# Patient Record
Sex: FEMALE | Race: WHITE | NOT HISPANIC OR LATINO | Employment: OTHER | URBAN - METROPOLITAN AREA
[De-identification: names, ages, dates, MRNs, and addresses within clinical notes are randomized per-mention and may not be internally consistent; named-entity substitution may affect disease eponyms.]

---

## 2017-05-16 ENCOUNTER — TELEPHONE (OUTPATIENT)
Dept: FAMILY MEDICINE | Facility: CLINIC | Age: 42
End: 2017-05-16

## 2017-05-16 NOTE — TELEPHONE ENCOUNTER
Pt called about having Diarrhea, Nausea, dizziness and back hurts. Pt states that she is unable to get comfortable laying down.   Called Pt back and got her VM. Left a message that she could try the BRAT diet and or give it 24 hours and see what happens.   She could also come in if she felt.  I also stated that she could reach back out to me if she wanted. Gave her both the CS and the Main number for appointments.     Samreen.AUTUMN Tomlinson ([facility])

## 2017-05-26 DIAGNOSIS — F33.0 MAJOR DEPRESSIVE DISORDER, RECURRENT EPISODE, MILD (H): ICD-10-CM

## 2017-05-26 RX ORDER — VENLAFAXINE HYDROCHLORIDE 75 MG/1
CAPSULE, EXTENDED RELEASE ORAL
Qty: 90 CAPSULE | Refills: 0 | OUTPATIENT
Start: 2017-05-26

## 2017-05-26 NOTE — TELEPHONE ENCOUNTER
Refused Prescriptions:                       Disp   Refills    venlafaxine (EFFEXOR-XR) 75 MG 24 hr capsu*90 cap*0        Sig: TAKE 3 CAPSULES BY MOUTH EVERY DAY  Refused By: NIKKI QUEEN  Reason for Refusal: Refill not appropriate    Pt has not been here since 9-.  Pt has an appt on 5- with [facility]  Talked to Bernadette. Pt is going to get meds from her pharmacy to cover her up to her appt.  Lida  [phone] (home)

## 2017-05-30 ENCOUNTER — OFFICE VISIT (OUTPATIENT)
Dept: FAMILY MEDICINE | Facility: CLINIC | Age: 42
End: 2017-05-30

## 2017-05-30 VITALS
DIASTOLIC BLOOD PRESSURE: 80 MMHG | WEIGHT: 160.4 LBS | BODY MASS INDEX: 24.03 KG/M2 | HEART RATE: 89 BPM | SYSTOLIC BLOOD PRESSURE: 116 MMHG | TEMPERATURE: 98 F | OXYGEN SATURATION: 99 %

## 2017-05-30 DIAGNOSIS — G47.00 INSOMNIA, UNSPECIFIED TYPE: Primary | ICD-10-CM

## 2017-05-30 DIAGNOSIS — Z23 NEED FOR VACCINATION: ICD-10-CM

## 2017-05-30 DIAGNOSIS — F33.0 MAJOR DEPRESSIVE DISORDER, RECURRENT EPISODE, MILD (H): ICD-10-CM

## 2017-05-30 DIAGNOSIS — R63.5 WEIGHT GAIN: ICD-10-CM

## 2017-05-30 DIAGNOSIS — J45.20 MILD INTERMITTENT ASTHMA WITHOUT COMPLICATION: ICD-10-CM

## 2017-05-30 PROCEDURE — 90732 PPSV23 VACC 2 YRS+ SUBQ/IM: CPT | Performed by: PHYSICIAN ASSISTANT

## 2017-05-30 PROCEDURE — 90472 IMMUNIZATION ADMIN EACH ADD: CPT | Performed by: PHYSICIAN ASSISTANT

## 2017-05-30 PROCEDURE — 99213 OFFICE O/P EST LOW 20 MIN: CPT | Mod: 25 | Performed by: PHYSICIAN ASSISTANT

## 2017-05-30 PROCEDURE — 90471 IMMUNIZATION ADMIN: CPT | Performed by: PHYSICIAN ASSISTANT

## 2017-05-30 PROCEDURE — 90715 TDAP VACCINE 7 YRS/> IM: CPT | Performed by: PHYSICIAN ASSISTANT

## 2017-05-30 RX ORDER — TRAZODONE HYDROCHLORIDE 50 MG/1
50 TABLET, FILM COATED ORAL AT BEDTIME
Qty: 90 TABLET | Refills: 3 | Status: SHIPPED | OUTPATIENT
Start: 2017-05-30 | End: 2018-07-12

## 2017-05-30 RX ORDER — ALBUTEROL SULFATE 90 UG/1
AEROSOL, METERED RESPIRATORY (INHALATION)
Qty: 18 G | Refills: 3 | Status: SHIPPED | OUTPATIENT
Start: 2017-05-30 | End: 2018-07-11

## 2017-05-30 RX ORDER — VENLAFAXINE HYDROCHLORIDE 75 MG/1
CAPSULE, EXTENDED RELEASE ORAL
Qty: 270 CAPSULE | Refills: 3 | Status: SHIPPED | OUTPATIENT
Start: 2017-05-30 | End: 2018-06-05

## 2017-05-30 NOTE — PATIENT INSTRUCTIONS
[facility] JEFF Ceballos  [address]   [address]  [place] [address]  [phone] - appt line  [phone] - fax    Please call to schedule appointment. Please check with your insurance regarding coverage - if the recommended physician/provider above is not in your network please contact our referral specialist Kesha for further assistance   [phone]    When you see the specialist, if any testing or Xrays are   ordered, this needs to be communicated with our   clinic referral specialists before the tests are done to make   sure the tests are covered.

## 2017-05-30 NOTE — MR AVS SNAPSHOT
After Visit Summary   5/30/2017    Madeline Schafer    MRN: [id number]           Patient Information     Date Of Birth          1975        Visit Information        Provider Department      5/30/2017 2:15 PM Deanna Dumont PA [facility]        Today's Diagnoses     Insomnia, unspecified type    -  1    Major depressive disorder, recurrent episode, mild (H)        Mild intermittent asthma without complication          Care Instructions    [facility] JEFF Ceballos  [address]   [address]  [place] [address]  [phone] - appt line  [phone] - fax    Please call to schedule appointment. Please check with your insurance regarding coverage - if the recommended physician/provider above is not in your network please contact our referral specialist Kesha for further assistance   [phone]    When you see the specialist, if any testing or Xrays are   ordered, this needs to be communicated with our   clinic referral specialists before the tests are done to make   sure the tests are covered.              Follow-ups after your visit        Who to contact     If you have questions or need follow up information about today's clinic visit or your schedule please contact [facility] directly at [phone].  Normal or non-critical lab and imaging results will be communicated to you by MyChart, letter or phone within 4 business days after the clinic has received the results. If you do not hear from us within 7 days, please contact the clinic through MyChart or phone. If you have a critical or abnormal lab result, we will notify you by phone as soon as possible.  Submit refill requests through [employer]â„¢ or call your pharmacy and they will forward the refill request to us. Please allow 3 business days for your refill to be completed.          Additional Information About Your  Visit        [employer] Information     [employer] gives you secure access to your electronic health record. If you see a primary care provider, you can also send messages to your care team and make appointments. If you have questions, please call your primary care clinic.  If you do not have a primary care provider, please call [phone] and they will assist you.        Care EveryWhere ID     This is your Care EveryWhere ID. This could be used by other organizations to access your [facility] medical records  [id number]        Your Vitals Were     Pulse Temperature Pulse Oximetry Breastfeeding? BMI (Body Mass Index)       89 98  F (36.7  C) (Oral) 99% No 24.03 kg/m2        Blood Pressure from Last 3 Encounters:   05/30/17 116/80   09/22/15 102/72   03/03/15 125/85    Weight from Last 3 Encounters:   05/30/17 72.8 kg (160 lb 6.4 oz)   09/22/15 76.7 kg (169 lb)   03/03/15 76.9 kg (169 lb 9.6 oz)              We Performed the Following     Asthma Action Plan (AAP)          Today's Medication Changes          These changes are accurate as of: 5/30/17  3:08 PM.  If you have any questions, ask your nurse or doctor.               These medicines have changed or have updated prescriptions.        Dose/Directions    albuterol 108 (90 BASE) MCG/ACT Inhaler   Commonly known as:  VENTOLIN HFA   This may have changed:  See the new instructions.   Used for:  Mild intermittent asthma without complication   Changed by:  Deanna Dumont PA        INHALE 2 PUFFS INTO THE LUNGS EVERY 6 HOURS AS NEEDED FOR SHORTNESS OF BREATH/ DYSPNEA   Quantity:  18 g   Refills:  3            Where to get your medicines      These medications were sent to [facility] [address] [facility] [address] [address] [address] [address], [facility] [address]     Phone:  [phone]     albuterol 108 (90 BASE) MCG/ACT Inhaler    traZODone 50 MG tablet    venlafaxine 75 MG 24 hr capsule                Primary Care  Provider Office Phone # Fax #    JEFF Givens [phone]       [facility]  [address]  [facility] [address]        Thank you!     Thank you for choosing [facility]  for your care. Our goal is always to provide you with excellent care. Hearing back from our patients is one way we can continue to improve our services. Please take a few minutes to complete the written survey that you may receive in the mail after your visit with us. Thank you!             Your Updated Medication List - Protect others around you: Learn how to safely use, store and throw away your medicines at www.disposemymeds.org.          This list is accurate as of: 5/30/17  3:08 PM.  Always use your most recent med list.                   Brand Name Dispense Instructions for use    albuterol 108 (90 BASE) MCG/ACT Inhaler    VENTOLIN HFA    18 g    INHALE 2 PUFFS INTO THE LUNGS EVERY 6 HOURS AS NEEDED FOR SHORTNESS OF BREATH/ DYSPNEA       MIRENA (52 MG) 20 MCG/24HR IUD   Generic drug:  levonorgestrel      None Entered       traZODone 50 MG tablet    DESYREL    90 tablet    Take 1 tablet (50 mg) by mouth At Bedtime       venlafaxine 75 MG 24 hr capsule    EFFEXOR-XR    270 capsule    TAKE 3 CAPSULES BY MOUTH EVERY DAY

## 2017-05-30 NOTE — PROGRESS NOTES
SUBJECTIVE:                                                    Madeline Schafer is a 41 year old female who presents to clinic today for the following health issues:      Depression Followup    Status since last visit: Stable     See PHQ-9 for current symptoms.  Other associated symptoms: None    Complicating factors:   Significant life event:  No   Current substance abuse:  None  Anxiety or Panic symptoms:  No    PHQ-9  English PHQ-9   Any Language          Asthma Follow-Up    Was ACT completed today?    Yes    ACT Total Scores 9/22/2015   ACT TOTAL SCORE -   ASTHMA ER VISITS -   ASTHMA HOSPITALIZATIONS -   ACT TOTAL SCORE (Goal Greater than or Equal to 20) 21   In the past 12 months, how many times did you visit the emergency room for your asthma without being admitted to the hospital? 0   In the past 12 months, how many times were you hospitalized overnight because of your asthma? 0       Recent asthma triggers that patient is dealing with: None      Pt interested on going back on Phentermine. Was on this for weight loss.  Lost 20 lbs.           ROS:   C: NEGATIVE for fever, chills, change in weight  E: NEGATIVE for vision changes or irritation  E/M: NEGATIVE for ear, mouth and throat problems  R: NEGATIVE for significant cough or SOB  CV: NEGATIVE for chest pain, palpitations or peripheral edema  GI: NEGATIVE for nausea, abdominal pain, heartburn, or change in bowel habits  : NEGATIVE for frequency, dysuria, or hematuria        Labs reviewed in EPIC  BP Readings from Last 3 Encounters:   05/30/17 116/80   09/22/15 102/72   03/03/15 125/85    Wt Readings from Last 3 Encounters:   05/30/17 72.8 kg (160 lb 6.4 oz)   09/22/15 76.7 kg (169 lb)   03/03/15 76.9 kg (169 lb 9.6 oz)                  Patient Active Problem List   Diagnosis     FAMILY HX GI MALIGNANCY     Allergic rhinitis     Health Care Home     Advance care planning     Major depressive disorder, recurrent episode, mild (H)     Mild intermittent  asthma without complication     Past Surgical History:   Procedure Laterality Date     HC EXCISION LESION/TENDON-SHEATH/CAPSULE, FOOT  age 11     HCL PAP THIN LAYER SCREEN  2007    Dr Acevedo       Social History   Substance Use Topics     Smoking status: Never Smoker     Smokeless tobacco: Not on file     Alcohol use 2.5 oz/week     5 drink(s) per week     Family History   Problem Relation Age of Onset     Lipids Mother      Lipids Father      Cancer - colorectal Maternal Grandfather       age 70 colon cancer     Prostate Cancer Father      diagnosed age 62         Current Outpatient Prescriptions   Medication Sig Dispense Refill     VENTOLIN  (90 BASE) MCG/ACT inhaler INHALE 2 PUFFS INTO THE LUNGS EVERY 6 HOURS AS NEEDED FOR SHORTNESS OF BREATH/ DYSPNEA 18 g 0     venlafaxine (EFFEXOR-XR) 75 MG 24 hr capsule TAKE 3 CAPSULES BY MOUTH EVERY  capsule 3     traZODone (DESYREL) 50 MG tablet Take 1 tablet by mouth At Bedtime. 90 tablet 0     MIRENA 20 MCG/24HR IU IUD None Entered       Allergies   Allergen Reactions     No Known Allergies      Seasonal Allergies      No lab results found.           OBJECTIVE:   /80 (BP Location: Left arm, Patient Position: Chair, Cuff Size: Adult Regular)  Pulse 89  Temp 98  F (36.7  C) (Oral)  Wt 72.8 kg (160 lb 6.4 oz)  SpO2 99%  Breastfeeding? No  BMI 24.03 kg/m2   Body mass index is 24.03 kg/(m^2).       GENERAL: healthy, alert and no distress  HEAD: Normocephalic, atraumatic  EYES: Eyes grossly normal to inspection, extraocular movements - intact in all directions. No discharge  EARS: canals- normal; TMs- normal  NOSE:  Nasal mucosa pink and moist. No abnormal discharge.  MOUTH:   Mucous membranes moist.  Pharynx non-erythematous, no exudates. No ulcers, no lesions  NECK: no tenderness, no adenopathy,  no masses, no stiffness; thyroid- normal to palpation  RESP: lungs clear to auscultation - no rales, no rhonchi, no wheezes  CV: regular rates and  rhythm, normal S1 S2, no S3 or S4 and no murmur, no click or rub     MS: extremities- no gross deformities noted  NEURO: strength and tone- normal, sensory exam- grossly normal, mentation- intact, speech- normal          ASSESSMENT/PLAN:                                                    1. Major depressive disorder, recurrent episode, mild (H)  - venlafaxine (EFFEXOR-XR) 75 MG 24 hr capsule; TAKE 3 CAPSULES BY MOUTH EVERY DAY  Dispense: 270 capsule; Refill: 3    2. Mild intermittent asthma without complication  - albuterol (VENTOLIN HFA) 108 (90 BASE) MCG/ACT Inhaler; INHALE 2 PUFFS INTO THE LUNGS EVERY 6 HOURS AS NEEDED FOR SHORTNESS OF BREATH/ DYSPNEA  Dispense: 18 g; Refill: 3    3. Insomnia, unspecified type  - traZODone (DESYREL) 50 MG tablet; Take 1 tablet (50 mg) by mouth At Bedtime  Dispense: 90 tablet; Refill: 3    4. Weight gain  Advised Endo/obesity consult    Follow Up: 1 year      [facility]

## 2017-05-30 NOTE — LETTER
My Asthma Action Plan  Name: Madeline Schafer   YOB: 1975  Date: 5/30/2017   My doctor: JEFF Givens   My clinic: [facility]        My Control Medicine: not needed  My Rescue Medicine: Albuterol (Proair/Ventolin/Proventil) inhaler 2 puffs every 4-6 hours   My Asthma Severity: intermittent  Avoid your asthma triggers: bulldogs               GREEN ZONE     Good Control    I feel good    No cough or wheeze    Can work, sleep and play without asthma symptoms       Take your asthma control medicine every day.     1. If exercise triggers your asthma, take your rescue medication    15 minutes before exercise or sports, and    During exercise if you have asthma symptoms  2. Spacer to use with inhaler: If you have a spacer, make sure to use it with your inhaler             YELLOW ZONE     Getting Worse  I have ANY of these:    I do not feel good    Cough or wheeze    Chest feels tight    Wake up at night   1. Keep taking your Green Zone medications  2. Start taking your rescue medicine:    every 20 minutes for up to 1 hour. Then every 4 hours for 24-48 hours.  3. If you stay in the Yellow Zone for more than 12-24 hours, contact your doctor.  4. If you do not return to the Green Zone in 12-24 hours or you get worse, start taking your oral steroid medicine if prescribed by your provider.           RED ZONE     Medical Alert - Get Help  I have ANY of these:    I feel awful    Medicine is not helping    Breathing getting harder    Trouble walking or talking    Nose opens wide to breathe       1. Take your rescue medicine NOW  2. If your provider has prescribed an oral steroid medicine, start taking it NOW  3. Call your doctor NOW  4. If you are still in the Red Zone after 20 minutes and you have not reached your doctor:    Take your rescue medicine again and    Call 911 or go to the emergency room right away    See your regular doctor within 2 weeks of an Emergency Room or  Urgent Care visit for follow-up treatment.        Electronically signed by: Deanna Dumont, May 30, 2017    Annual Reminders:  Meet with Asthma Educator,  Flu Shot in the Fall, consider Pneumonia Vaccination for patients with asthma (aged 19 and older).    [facility] [address] [place] [address] [address]                    Asthma Triggers  How To Control Things That Make Your Asthma Worse    Triggers are things that make your asthma worse.  Look at the list below to help you find your triggers and what you can do about them.  You can help prevent asthma flare-ups by staying away from your triggers.      Trigger                                                          What you can do   Cigarette Smoke  Tobacco smoke can make asthma worse. Do not allow smoking in your home, car or around you.  Be sure no one smokes at a child s day care or school.  If you smoke, ask your health care provider for ways to help you quit.  Ask family members to quit too.  Ask your health care provider for a referral to Quit Plan to help you quit smoking, or call 2-324-406-PLAN.     Colds, Flu, Bronchitis  These are common triggers of asthma. Wash your hands often.  Don t touch your eyes, nose or mouth.  Get a flu shot every year.     Dust Mites  These are tiny bugs that live in cloth or carpet. They are too small to see. Wash sheets and blankets in hot water every week.   Encase pillows and mattress in dust mite proof covers.  Avoid having carpet if you can. If you have carpet, vacuum weekly.   Use a dust mask and HEPA vacuum.   Pollen and Outdoor Mold  Some people are allergic to trees, grass, or weed pollen, or molds. Try to keep your windows closed.  Limit time out doors when pollen count is high.   Ask you health care provider about taking medicine during allergy season.     Animal Dander  Some people are allergic to skin flakes, urine or saliva from pets with fur or  feathers. Keep pets with fur or feathers out of your home.    If you can t keep the pet outdoors, then keep the pet out of your bedroom.  Keep the bedroom door closed.  Keep pets off cloth furniture and away from stuffed toys.     Mice, Rats, and Cockroaches  Some people are allergic to the waste from these pests.   Cover food and garbage.  Clean up spills and food crumbs.  Store grease in the refrigerator.   Keep food out of the bedroom.   Indoor Mold  This can be a trigger if your home has high moisture. Fix leaking faucets, pipes, or other sources of water.   Clean moldy surfaces.  Dehumidify basement if it is damp and smelly.   Smoke, Strong Odors, and Sprays  These can reduce air quality. Stay away from strong odors and sprays, such as perfume, powder, hair spray, paints, smoke incense, paint, cleaning products, candles and new carpet.   Exercise or Sports  Some people with asthma have this trigger. Be active!  Ask your doctor about taking medicine before sports or exercise to prevent symptoms.    Warm up for 5-10 minutes before and after sports or exercise.     Other Triggers of Asthma  Cold air:  Cover your nose and mouth with a scarf.  Sometimes laughing or crying can be a trigger.  Some medicines and food can trigger asthma.

## 2017-05-30 NOTE — NURSING NOTE
Madeline is here for a non-fasting medication recheck.     Pre-Visit Screening :  Immunizations : up to date    Colonoscopy : NA  Mammogram : Up to date  Asthma Action Test/Plan : done today  PHQ9/GAD7 :  Done today    Pulse - regular  My Chart - accepts    CLASSIFICATION OF OVERWEIGHT AND OBESITY BY BMI                         Obesity Class           BMI(kg/m2)  Underweight                                    < 18.5  Normal                                         18.5-24.9  Overweight                                     25.0-29.9  OBESITY                     I                  30.0-34.9                              II                 35.0-39.9  EXTREME OBESITY             III                >40                             Patient's  BMI Body mass index is 24.03 kg/(m^2).  http://hin.nhlbi.nih.gov/menuplanner/menu.cgi  Questioned patient about current smoking habits.  Pt. has never smoked.    AUTUMN Reyes ([facility])

## 2017-05-31 ASSESSMENT — PATIENT HEALTH QUESTIONNAIRE - PHQ9: SUM OF ALL RESPONSES TO PHQ QUESTIONS 1-9: 1

## 2017-08-12 ENCOUNTER — HEALTH MAINTENANCE LETTER (OUTPATIENT)
Age: 42
End: 2017-08-12

## 2017-09-11 ENCOUNTER — HOSPITAL ENCOUNTER (OUTPATIENT)
Dept: MAMMOGRAPHY | Facility: CLINIC | Age: 42
Discharge: HOME | End: 2017-09-11
Attending: OBSTETRICS & GYNECOLOGY | Admitting: OBSTETRICS & GYNECOLOGY
Payer: COMMERCIAL

## 2017-09-11 DIAGNOSIS — Z12.31 VISIT FOR SCREENING MAMMOGRAM: ICD-10-CM

## 2017-09-11 PROCEDURE — 77063 BREAST TOMOSYNTHESIS BI: CPT

## 2017-09-11 PROCEDURE — G0202 SCR MAMMO BI INCL CAD: HCPCS

## 2018-02-12 ENCOUNTER — OFFICE VISIT (OUTPATIENT)
Dept: FAMILY MEDICINE | Facility: CLINIC | Age: 43
End: 2018-02-12

## 2018-02-12 VITALS
OXYGEN SATURATION: 98 % | WEIGHT: 169.6 LBS | HEART RATE: 92 BPM | SYSTOLIC BLOOD PRESSURE: 124 MMHG | BODY MASS INDEX: 25.41 KG/M2 | DIASTOLIC BLOOD PRESSURE: 90 MMHG | TEMPERATURE: 98 F

## 2018-02-12 DIAGNOSIS — J01.90 ACUTE SINUSITIS WITH SYMPTOMS > 10 DAYS: Primary | ICD-10-CM

## 2018-02-12 PROCEDURE — 99213 OFFICE O/P EST LOW 20 MIN: CPT | Performed by: FAMILY MEDICINE

## 2018-02-12 NOTE — PROGRESS NOTES
SUBJECTIVE:   Madeline Schafer is a 42 year old female who presents to clinic today for the following health issues:    Two weeks ago, the patient developed an upper respiratory cold after her son brought home the illness from school. Four days ago, her symptoms worsened and now she is concerned she may have a sinus infection. The patient currently complains of sinus congestion, bilateral ear pain, headache, and dental pain. She also has low back pain, but attributes this to the cough she had before her sinus symptoms developed. She has no urinary symptoms. She has a history of sinus infections, but has not been treated for one for a few years. No previous sinus surgery or recent air travel. She has been treating with over the counter decongestants.     Problem list and histories reviewed & adjusted, as indicated.  Additional history: as documented    Patient Active Problem List   Diagnosis     FAMILY HX GI MALIGNANCY     Allergic rhinitis     Health Care Home     Advance care planning     Major depressive disorder, recurrent episode, mild (H)     Mild intermittent asthma without complication     Past Surgical History:   Procedure Laterality Date     HC EXCISION LESION/TENDON-SHEATH/CAPSULE, FOOT  age 11     HCL PAP THIN LAYER SCREEN  2007    Dr Acevedo       Social History   Substance Use Topics     Smoking status: Never Smoker     Smokeless tobacco: Never Used     Alcohol use 2.5 oz/week     5 Standard drinks or equivalent per week     Family History   Problem Relation Age of Onset     Lipids Mother      Lipids Father      Cancer - colorectal Maternal Grandfather       age 70 colon cancer     Prostate Cancer Father      diagnosed age 62         Current Outpatient Prescriptions   Medication Sig Dispense Refill     venlafaxine (EFFEXOR-XR) 75 MG 24 hr capsule TAKE 3 CAPSULES BY MOUTH EVERY  capsule 3     traZODone (DESYREL) 50 MG tablet Take 1 tablet (50 mg) by mouth At Bedtime 90 tablet 3      albuterol (VENTOLIN HFA) 108 (90 BASE) MCG/ACT Inhaler INHALE 2 PUFFS INTO THE LUNGS EVERY 6 HOURS AS NEEDED FOR SHORTNESS OF BREATH/ DYSPNEA 18 g 3     MIRENA 20 MCG/24HR IU IUD None Entered         Reviewed and updated as needed this visit by clinical staff  Tobacco  Allergies  Meds  Problems  Soc Hx      Reviewed and updated as needed this visit by Provider         ROS:   ROS: 7 point ROS neg other than the symptoms noted above in the HPI.      OBJECTIVE:     /90 (BP Location: Left arm, Patient Position: Chair, Cuff Size: Adult Regular)  Pulse 92  Temp 98  F (36.7  C) (Oral)  Wt 76.9 kg (169 lb 9.6 oz)  SpO2 98%  Breastfeeding? No  BMI 25.41 kg/m2  Body mass index is 25.41 kg/(m^2).   Constitutional: Alert, oriented, mildly Ill appearing  HEENT: External ears  and canals clear bilaterally. TM's normal bilaterally. Nose normal without lesions or discharge. Oropharynx normal. Neck supple without palpable adenopathy.  Cardiovascular: Regular rate and  rhythm. S1 and S2 normal, no murmurs, clicks, gallops or rubs. No edema or JVD.  Pulmonary: Chest is clear; no wheezes or rales.     Diagnostic Test Results:  none     ASSESSMENT/PLAN:     Problem List Items Addressed This Visit     None           (J01.90) Acute sinusitis with symptoms > 10 days  (primary encounter diagnosis)  Comment: Trial of medication. Call or return to clinic prn if these symtoms worsen, fail to improve as anticipated, or if new symptoms develop.  Plan: amoxicillin-clavulanate (AUGMENTIN) 875-125 MG         per tablet            Scribe Statement: IJavan, PSS, am scribing for and in the presence of Malena Conde MD. 2/12/2018 1:51 PM    Provider Statement: I personally performed this service and the scribe documentation above accurately reflects this service. Malena Conde MD 2/12/2018 1:51 PM    Malena Conde MD  [facility]

## 2018-02-12 NOTE — MR AVS SNAPSHOT
After Visit Summary   2/12/2018    Madeline Schafer    MRN: [id number]           Patient Information     Date Of Birth          1975        Visit Information        Provider Department      2/12/2018 6:45 PM Malena Conde MD [facility]        Today's Diagnoses     Acute sinusitis with symptoms > 10 days    -  1       Follow-ups after your visit        Who to contact     If you have questions or need follow up information about today's clinic visit or your schedule please contact [facility] directly at [phone].  Normal or non-critical lab and imaging results will be communicated to you by [employer], letter or phone within 4 business days after the clinic has received the results. If you do not hear from us within 7 days, please contact the clinic through [employer] or phone. If you have a critical or abnormal lab result, we will notify you by phone as soon as possible.  Submit refill requests through [employer] or call your pharmacy and they will forward the refill request to us. Please allow 3 business days for your refill to be completed.          Additional Information About Your Visit        MyChart Information     [employer] gives you secure access to your electronic health record. If you see a primary care provider, you can also send messages to your care team and make appointments. If you have questions, please call your primary care clinic.  If you do not have a primary care provider, please call [phone] and they will assist you.        Care EveryWhere ID     This is your Care EveryWhere ID. This could be used by other organizations to access your [place] medical records  [id number]        Your Vitals Were     Pulse Temperature Pulse Oximetry Breastfeeding? BMI (Body Mass Index)       92 98  F (36.7  C) (Oral) 98% No 25.41 kg/m2        Blood Pressure from Last 3 Encounters:   02/12/18 124/90   05/30/17 116/80   09/22/15 102/72     Weight from Last 3 Encounters:   02/12/18 76.9 kg (169 lb 9.6 oz)   05/30/17 72.8 kg (160 lb 6.4 oz)   09/22/15 76.7 kg (169 lb)              Today, you had the following     No orders found for display         Today's Medication Changes          These changes are accurate as of 2/12/18 11:59 PM.  If you have any questions, ask your nurse or doctor.               Start taking these medicines.        Dose/Directions    amoxicillin-clavulanate 875-125 MG per tablet   Commonly known as:  AUGMENTIN   Used for:  Acute sinusitis with symptoms > 10 days   Started by:  Malena Conde MD        Dose:  1 tablet   Take 1 tablet by mouth 2 times daily   Quantity:  20 tablet   Refills:  0            Where to get your medicines      These medications were sent to [facility] [id number] [place] [address] [address] [facility] AT [address]  [address] [facility] [address]     Phone:  [phone]     amoxicillin-clavulanate 875-125 MG per tablet                Primary Care Provider Office Phone # Fax #    JEFF Givens [phone] [address]  [facility] [address]        Equal Access to Services     JACOBO WRIGHT AH: Hadii aad ku hadasho Soomaali, waaxda luqadaha, qaybta kaalmada adeegyada, waxay idiin hayaristeon cayla khglenna labarbara brown. So [facility] [phone].    ATENCIÓN: Si habla español, tiene a xie disposición servicios gratuitos de asistencia lingüística. ame al [phone].    We comply with applicable federal civil rights laws and [place] laws. We do not discriminate on the basis of race, color, national origin, age, disability, sex, sexual orientation, or gender identity.            Thank you!     Thank you for choosing [facility]  for your care. Our goal is always to provide you with excellent care. Hearing back from our patients is one way we can continue to improve our services. Please take a few minutes to complete the written  survey that you may receive in the mail after your visit with us. Thank you!             Your Updated Medication List - Protect others around you: Learn how to safely use, store and throw away your medicines at www.disposemymeds.org.          This list is accurate as of 2/12/18 11:59 PM.  Always use your most recent med list.                   Brand Name Dispense Instructions for use Diagnosis    albuterol 108 (90 BASE) MCG/ACT Inhaler    VENTOLIN HFA    18 g    INHALE 2 PUFFS INTO THE LUNGS EVERY 6 HOURS AS NEEDED FOR SHORTNESS OF BREATH/ DYSPNEA    Mild intermittent asthma without complication       amoxicillin-clavulanate 875-125 MG per tablet    AUGMENTIN    20 tablet    Take 1 tablet by mouth 2 times daily    Acute sinusitis with symptoms > 10 days       MIRENA (52 MG) 20 MCG/24HR IUD   Generic drug:  levonorgestrel      None Entered        traZODone 50 MG tablet    DESYREL    90 tablet    Take 1 tablet (50 mg) by mouth At Bedtime    Insomnia, unspecified type       venlafaxine 75 MG 24 hr capsule    EFFEXOR-XR    270 capsule    TAKE 3 CAPSULES BY MOUTH EVERY DAY    Major depressive disorder, recurrent episode, mild (H)

## 2018-02-13 NOTE — NURSING NOTE
Madeline is here for sinus issues X 2 weeks, pressure in sinus area, headaches, pt states over the weekend it got worse, ear pain in both ears    Pre-Visit Screening :  Immunizations : up to date    Colonoscopy : na  Mammogram : is up to date  Asthma Action Test/Plan : na  PHQ9/GAD7 :  Na    Pulse - regular  My Chart - accepts    CLASSIFICATION OF OVERWEIGHT AND OBESITY BY BMI                         Obesity Class           BMI(kg/m2)  Underweight                                    < 18.5  Normal                                         18.5-24.9  Overweight                                     25.0-29.9  OBESITY                     I                  30.0-34.9                              II                 35.0-39.9  EXTREME OBESITY             III                >40                             Patient's  BMI Body mass index is 22.15 kg/(m^2).  http://hin.nhlbi.nih.gov/menuplanner/menu.cgi  Questioned patient about current smoking habits.  Pt. has never smoked.  The patient has verbalized that it is ok to leave a detailed voice message on the patient's cell phone with results/recommendations from this visit.       Verified [phone]  phone number:

## 2018-02-15 ENCOUNTER — TELEPHONE (OUTPATIENT)
Dept: FAMILY MEDICINE | Facility: CLINIC | Age: 43
End: 2018-02-15

## 2018-02-15 NOTE — TELEPHONE ENCOUNTER
Madeline saw [facility] on Monday evening for sinus inf.  She is now experiencing a lot of chest discomfort and wondering if the med will cover this and is it normal for it to move to her chest.  Also she believes she has a yeast inf from the medication and would like something to help relieve it.    Please call pt at [phone]

## 2018-06-05 DIAGNOSIS — F33.0 MAJOR DEPRESSIVE DISORDER, RECURRENT EPISODE, MILD (H): ICD-10-CM

## 2018-06-05 RX ORDER — VENLAFAXINE HYDROCHLORIDE 75 MG/1
CAPSULE, EXTENDED RELEASE ORAL
Qty: 90 CAPSULE | Refills: 0 | COMMUNITY
Start: 2018-06-05 | End: 2018-07-11

## 2018-06-05 RX ORDER — VENLAFAXINE HYDROCHLORIDE 75 MG/1
CAPSULE, EXTENDED RELEASE ORAL
Qty: 270 CAPSULE | Refills: 0 | OUTPATIENT
Start: 2018-06-05

## 2018-06-05 NOTE — TELEPHONE ENCOUNTER
[facility]'s  venlafaxine (EFFEXOR-XR) 75 MG 24 hr capsule  Pt due for a non fasting ov  [place]  [phone] ([place])

## 2018-07-11 ENCOUNTER — OFFICE VISIT (OUTPATIENT)
Dept: FAMILY MEDICINE | Facility: CLINIC | Age: 43
End: 2018-07-11

## 2018-07-11 VITALS
HEART RATE: 79 BPM | BODY MASS INDEX: 26.13 KG/M2 | OXYGEN SATURATION: 98 % | DIASTOLIC BLOOD PRESSURE: 76 MMHG | WEIGHT: 174.4 LBS | TEMPERATURE: 98.2 F | SYSTOLIC BLOOD PRESSURE: 118 MMHG

## 2018-07-11 DIAGNOSIS — F33.0 MAJOR DEPRESSIVE DISORDER, RECURRENT EPISODE, MILD (H): ICD-10-CM

## 2018-07-11 DIAGNOSIS — J45.20 MILD INTERMITTENT ASTHMA WITHOUT COMPLICATION: ICD-10-CM

## 2018-07-11 DIAGNOSIS — Z12.11 SPECIAL SCREENING FOR MALIGNANT NEOPLASMS, COLON: Primary | ICD-10-CM

## 2018-07-11 DIAGNOSIS — E66.3 OVERWEIGHT: ICD-10-CM

## 2018-07-11 PROCEDURE — 99213 OFFICE O/P EST LOW 20 MIN: CPT | Performed by: PHYSICIAN ASSISTANT

## 2018-07-11 RX ORDER — VENLAFAXINE HYDROCHLORIDE 75 MG/1
CAPSULE, EXTENDED RELEASE ORAL
Qty: 90 CAPSULE | Refills: 3 | Status: SHIPPED | OUTPATIENT
Start: 2018-07-11 | End: 2018-07-16

## 2018-07-11 RX ORDER — ALBUTEROL SULFATE 90 UG/1
AEROSOL, METERED RESPIRATORY (INHALATION)
Qty: 18 G | Refills: 3 | Status: SHIPPED | OUTPATIENT
Start: 2018-07-11 | End: 2019-07-22

## 2018-07-11 NOTE — LETTER
My Asthma Action Plan  Name: Madeline Schafer   YOB: 1975  Date: 7/11/2018   My doctor: JEFF Givens   My clinic: [facility]        My Control Medicine: None  My Rescue Medicine: Albuterol (Proair/Ventolin/Proventil) inhaler as needed   My Asthma Severity: intermittent  Avoid your asthma triggers: Patient is unaware of triggers  None            GREEN ZONE   Good Control    I feel good    No cough or wheeze    Can work, sleep and play without asthma symptoms       Take your asthma control medicine every day.     1. If exercise triggers your asthma, take your rescue medication    15 minutes before exercise or sports, and    During exercise if you have asthma symptoms  2. Spacer to use with inhaler: If you have a spacer, make sure to use it with your inhaler             YELLOW ZONE Getting Worse  I have ANY of these:    I do not feel good    Cough or wheeze    Chest feels tight    Wake up at night   1. Keep taking your Green Zone medications  2. Start taking your rescue medicine:    every 20 minutes for up to 1 hour. Then every 4 hours for 24-48 hours.  3. If you stay in the Yellow Zone for more than 12-24 hours, contact your doctor.  4. If you do not return to the Green Zone in 12-24 hours or you get worse, start taking your oral steroid medicine if prescribed by your provider.           RED ZONE Medical Alert - Get Help  I have ANY of these:    I feel awful    Medicine is not helping    Breathing getting harder    Trouble walking or talking    Nose opens wide to breathe       1. Take your rescue medicine NOW  2. If your provider has prescribed an oral steroid medicine, start taking it NOW  3. Call your doctor NOW  4. If you are still in the Red Zone after 20 minutes and you have not reached your doctor:    Take your rescue medicine again and    Call 911 or go to the emergency room right away    See your regular doctor within 2 weeks of an Emergency Room or  Urgent Care visit for follow-up treatment.          Annual Reminders:  Meet with Asthma Educator,  Flu Shot in the Fall, consider Pneumonia Vaccination for patients with asthma (aged 19 and older).    [facility] [address] [place] [address] [address] AT [address]                      Asthma Triggers  How To Control Things That Make Your Asthma Worse    Triggers are things that make your asthma worse.  Look at the list below to help you find your triggers and what you can do about them.  You can help prevent asthma flare-ups by staying away from your triggers.      Trigger                                                          What you can do   Cigarette Smoke  Tobacco smoke can make asthma worse. Do not allow smoking in your home, car or around you.  Be sure no one smokes at a child s day care or school.  If you smoke, ask your health care provider for ways to help you quit.  Ask family members to quit too.  Ask your health care provider for a referral to Quit Plan to help you quit smoking, or call 8-106-216-PLAN.     Colds, Flu, Bronchitis  These are common triggers of asthma. Wash your hands often.  Don t touch your eyes, nose or mouth.  Get a flu shot every year.     Dust Mites  These are tiny bugs that live in cloth or carpet. They are too small to see. Wash sheets and blankets in hot water every week.   Encase pillows and mattress in dust mite proof covers.  Avoid having carpet if you can. If you have carpet, vacuum weekly.   Use a dust mask and HEPA vacuum.   Pollen and Outdoor Mold  Some people are allergic to trees, grass, or weed pollen, or molds. Try to keep your windows closed.  Limit time out doors when pollen count is high.   Ask you health care provider about taking medicine during allergy season.     Animal Dander  Some people are allergic to skin flakes, urine or saliva from pets with fur or feathers. Keep pets with fur or feathers out of your home.    If you  can t keep the pet outdoors, then keep the pet out of your bedroom.  Keep the bedroom door closed.  Keep pets off cloth furniture and away from stuffed toys.     Mice, Rats, and Cockroaches  Some people are allergic to the waste from these pests.   Cover food and garbage.  Clean up spills and food crumbs.  Store grease in the refrigerator.   Keep food out of the bedroom.   Indoor Mold  This can be a trigger if your home has high moisture. Fix leaking faucets, pipes, or other sources of water.   Clean moldy surfaces.  Dehumidify basement if it is damp and smelly.   Smoke, Strong Odors, and Sprays  These can reduce air quality. Stay away from strong odors and sprays, such as perfume, powder, hair spray, paints, smoke incense, paint, cleaning products, candles and new carpet.   Exercise or Sports  Some people with asthma have this trigger. Be active!  Ask your doctor about taking medicine before sports or exercise to prevent symptoms.    Warm up for 5-10 minutes before and after sports or exercise.     Other Triggers of Asthma  Cold air:  Cover your nose and mouth with a scarf.  Sometimes laughing or crying can be a trigger.  Some medicines and food can trigger asthma.

## 2018-07-11 NOTE — NURSING NOTE
Madeline is here for a med check    Pre-Visit Screening :  Immunizations : up to date    Colonoscopy : na  Mammogram : is up to date  Asthma Action Test/Plan : na  PHQ9/GAD7 :  Given    Pulse - regular  My Chart - accepts    CLASSIFICATION OF OVERWEIGHT AND OBESITY BY BMI                         Obesity Class           BMI(kg/m2)  Underweight                                    < 18.5  Normal                                         18.5-24.9  Overweight                                     25.0-29.9  OBESITY                     I                  30.0-34.9                              II                 35.0-39.9  EXTREME OBESITY             III                >40                             Patient's  BMI Body mass index is 22.15 kg/(m^2).  http://hin.nhlbi.nih.gov/menuplanner/menu.cgi  Questioned patient about current smoking habits.  Pt. has never smoked.  The patient has verbalized that it is ok to leave a detailed voice message on the patient's cell phone with results/recommendations from this visit.       Verified [phone] phone number:

## 2018-07-11 NOTE — PROGRESS NOTES
SUBJECTIVE:   Madeline Schafer is a 43 year old female who presents to clinic today for the following health issues:      Depression Followup    Status since last visit: Improved     See PHQ-9 for current symptoms.  Other associated symptoms: None    Complicating factors:   Significant life event:  Yes-  Death of mother anniversary is today - doing well   Current substance abuse:  None  Anxiety or Panic symptoms:  No      Trazodone - rare - every 3-4 months for a couple weeks.     PHQ-9 9/22/2015 5/30/2017   Total Score 2 1   Q9: Suicide Ideation Not at all Not at all     In the past two weeks have you had thoughts of suicide or self-harm?  No.    Do you have concerns about your personal safety or the safety of others?   No  PHQ-9  English  PHQ-9   Any Language  Suicide Assessment Five-step Evaluation and Treatment (SAFE-T)    Asthma Follow-Up    Was ACT completed today?    Yes    ACT Total Scores 7/11/2018   ACT TOTAL SCORE -   ASTHMA ER VISITS -   ASTHMA HOSPITALIZATIONS -   ACT TOTAL SCORE (Goal Greater than or Equal to 20) 25   In the past 12 months, how many times did you visit the emergency room for your asthma without being admitted to the hospital? 0   In the past 12 months, how many times were you hospitalized overnight because of your asthma? 0       Recent asthma triggers that patient is dealing with: None        Amount of exercise or physical activity: 2-3 days/week for an average of 30-45 minutes    Problems taking medications regularly: No    Medication side effects: none    Diet: regular (no restrictions)        Sees OBGYN  Due for colonoscopy        Problem list and histories reviewed & adjusted, as indicated.  Additional history: as documented    Patient Active Problem List   Diagnosis     FAMILY HX GI MALIGNANCY     Allergic rhinitis     Health Care Home     Advance care planning     Major depressive disorder, recurrent episode, mild (H)     Mild intermittent asthma without complication     Past  Surgical History:   Procedure Laterality Date     HC EXCISION LESION/TENDON-SHEATH/CAPSULE, FOOT  age 11     HCL PAP THIN LAYER SCREEN  2007    Dr Acevedo       Social History   Substance Use Topics     Smoking status: Never Smoker     Smokeless tobacco: Never Used     Alcohol use 2.5 oz/week     5 Standard drinks or equivalent per week     Family History   Problem Relation Age of Onset     Lipids Mother      Lipids Father      Cancer - colorectal Maternal Grandfather       age 70 colon cancer     Prostate Cancer Father      diagnosed age 62         Current Outpatient Prescriptions   Medication Sig Dispense Refill     albuterol (VENTOLIN HFA) 108 (90 BASE) MCG/ACT Inhaler INHALE 2 PUFFS INTO THE LUNGS EVERY 6 HOURS AS NEEDED FOR SHORTNESS OF BREATH/ DYSPNEA 18 g 3     MIRENA 20 MCG/24HR IU IUD None Entered       traZODone (DESYREL) 50 MG tablet Take 1 tablet (50 mg) by mouth At Bedtime 90 tablet 3     venlafaxine (EFFEXOR-XR) 75 MG 24 hr capsule TAKE 3 CAPSULES BY MOUTH EVERY DAY 90 capsule 0     Allergies   Allergen Reactions     No Known Allergies      Seasonal Allergies      No lab results found.       BP Readings from Last 3 Encounters:   18 118/76   18 124/90   17 116/80    Wt Readings from Last 3 Encounters:   18 79.1 kg (174 lb 6.4 oz)   18 76.9 kg (169 lb 9.6 oz)   17 72.8 kg (160 lb 6.4 oz)                  Labs reviewed in EPIC    Reviewed and updated as needed this visit by clinical staff  Tobacco  Allergies  Meds  Problems       Reviewed and updated as needed this visit by Provider         ROS:  EYES: NEGATIVE for vision changes or irritation  ENT/MOUTH: NEGATIVE for ear, mouth and throat problems  RESP: NEGATIVE for significant cough or SOB  CV: NEGATIVE for chest pain, palpitations or peripheral edema  GI: NEGATIVE for nausea, abdominal pain, heartburn, or change in bowel habits  : NEGATIVE for frequency, dysuria, or hematuria    OBJECTIVE:     /76  "(BP Location: Right arm, Patient Position: Chair, Cuff Size: Adult Large)  Pulse 79  Temp 98.2  F (36.8  C) (Oral)  Wt 79.1 kg (174 lb 6.4 oz)  SpO2 98%  Breastfeeding? No  BMI 26.13 kg/m2  Body mass index is 26.13 kg/(m^2).  GENERAL: healthy, alert and no distress  EYES: Eyes grossly normal to inspection,  conjunctivae and sclerae normal  EARS: ear canals and TM's normal  NOSE: no discharge noted  MOUTH: mouth without ulcers or lesions, mucous membranes moist  NECK: no adenopathy, no asymmetry, masses, or scars and thyroid normal to palpation  CV: regular rate and rhythm, normal S1 S2, no S3 or S4, no murmur, click or rub  RESP: lungs clear to auscultation bilaterally - no rales, rhonchi or wheezes      Mental Status Exam:   Appearance: calm  Grooming: adequately groomed  Demeanor: engaged, cooperative  Affect: normal  Speech: Normal.  Gait:Normal.  Movements: Normal  Form of thought: Logical, Linear and Goal directed  Thought content:  Normal  Insight:Good   Judgment: Good   Cognition: Good       ASSESSMENT/PLAN:     1. Major depressive disorder, recurrent episode, mild (H)  Controlled  OK for Trazodone refills for 1 year  - venlafaxine (EFFEXOR-XR) 75 MG 24 hr capsule; TAKE 3 CAPSULES BY MOUTH EVERY DAY  Dispense: 90 capsule; Refill: 3    2. Mild intermittent asthma without complication    - albuterol (VENTOLIN HFA) 108 (90 Base) MCG/ACT Inhaler; INHALE 2 PUFFS INTO THE LUNGS EVERY 6 HOURS AS NEEDED FOR SHORTNESS OF BREATH/ DYSPNEA  Dispense: 18 g; Refill: 3    3. Special screening for malignant neoplasms, colon    - GASTROENTEROLOGY ADULT REF PROCEDURE ONLY Other; [facility] [phone]    4. Overweight  Pt starting to work with     BMI:   Estimated body mass index is 26.13 kg/(m^2) as calculated from the following:    Height as of 9/22/15: 1.74 m (5' 8.5\").    Weight as of this encounter: 79.1 kg (174 lb 6.4 oz).   Weight management plan: Discussed healthy diet and exercise guidelines and " patient will follow up in 12 months in clinic to re-evaluate.      FUTURE APPOINTMENTS:       - Follow-up visit in 1 year    Deanna Dumont PA-C  7/11/2018

## 2018-07-11 NOTE — MR AVS SNAPSHOT
After Visit Summary   7/11/2018    Madeline Schafer    MRN: [id number]           Patient Information     Date Of Birth          1975        Visit Information        Provider Department      7/11/2018 10:15 AM Deanna Dumont PA [facility]        Today's Diagnoses     Special screening for malignant neoplasms, colon    -  1    Major depressive disorder, recurrent episode, mild (H)        Mild intermittent asthma without complication        Overweight           Follow-ups after your visit        Additional Services     GASTROENTEROLOGY ADULT REF PROCEDURE ONLY Other; [facility] [phone]       Last Lab Result: No results found for: CR  Body mass index is 26.13 kg/(m^2).      Patient will be contacted to schedule procedure.     Please be aware that coverage of these services is subject to the terms and limitations of your health insurance plan.  Call member services at your health plan with any benefit or coverage questions.  Any procedures must be performed at a [facility] facility OR coordinated by your clinic's referral office.    Please bring the following with you to your appointment:    (1) Any X-Rays, CTs or MRIs which have been performed.  Contact the facility where they were done to arrange for  prior to your scheduled appointment.    (2) List of current medications   (3) This referral request   (4) Any documents/labs given to you for this referral                  Who to contact     If you have questions or need follow up information about today's clinic visit or your schedule please contact [facility] directly at [phone].  Normal or non-critical lab and imaging results will be communicated to you by MyChart, letter or phone within 4 business days after the clinic has received the results. If you do not hear from us within 7 days, please contact the clinic through MyChart or phone. If you have a critical or abnormal lab  result, we will notify you by phone as soon as possible.  Submit refill requests through [facility] or call your pharmacy and they will forward the refill request to us. Please allow 3 business days for your refill to be completed.          Additional Information About Your Visit        [facility] Information     [facility] gives you secure access to your electronic health record. If you see a primary care provider, you can also send messages to your care team and make appointments. If you have questions, please call your primary care clinic.  If you do not have a primary care provider, please call [phone] and they will assist you.        Care EveryWhere ID     This is your Care EveryWhere ID. This could be used by other organizations to access your [facility] medical records  [id number]        Your Vitals Were     Pulse Temperature Pulse Oximetry Breastfeeding? BMI (Body Mass Index)       79 98.2  F (36.8  C) (Oral) 98% No 26.13 kg/m2        Blood Pressure from Last 3 Encounters:   07/11/18 118/76   02/12/18 124/90   05/30/17 116/80    Weight from Last 3 Encounters:   07/11/18 79.1 kg (174 lb 6.4 oz)   02/12/18 76.9 kg (169 lb 9.6 oz)   05/30/17 72.8 kg (160 lb 6.4 oz)              We Performed the Following     GASTROENTEROLOGY ADULT REF PROCEDURE ONLY Other; [facility] [phone]          Where to get your medicines      These medications were sent to [facility] [id number] [place] [address] [facility] [address]  [address] [facility] [address]     Phone:  [phone]     venlafaxine 75 MG 24 hr capsule         Some of these will need a paper prescription and others can be bought over the counter.  Ask your nurse if you have questions.     Bring a paper prescription for each of these medications     albuterol 108 (90 Base) MCG/ACT Inhaler          Primary Care Provider Office Phone # Fax #    JEFF Givens [phone] [address]  [facility] [address]        Equal Access to Services     [place] [facility] : Hadii miko zhou bertrandbernabe Jesuali, wajonelleda luqdanielha, qafrancista veroniqueeliezertato ford. So [facility] [phone].    ATENCIÓN: Si habla español, tiene a xie disposición servicios gratuitos de asistencia lingüística. Estherame al [phone].    We comply with applicable federal civil rights laws and [place] laws. We do not discriminate on the basis of race, color, national origin, age, disability, sex, sexual orientation, or gender identity.            Thank you!     Thank you for choosing [facility]  for your care. Our goal is always to provide you with excellent care. Hearing back from our patients is one way we can continue to improve our services. Please take a few minutes to complete the written survey that you may receive in the mail after your visit with us. Thank you!             Your Updated Medication List - Protect others around you: Learn how to safely use, store and throw away your medicines at www.disposemymeds.org.          This list is accurate as of 7/11/18 10:45 AM.  Always use your most recent med list.                   Brand Name Dispense Instructions for use Diagnosis    albuterol 108 (90 Base) MCG/ACT Inhaler    VENTOLIN HFA    18 g    INHALE 2 PUFFS INTO THE LUNGS EVERY 6 HOURS AS NEEDED FOR SHORTNESS OF BREATH/ DYSPNEA    Mild intermittent asthma without complication       MIRENA (52 MG) 20 MCG/24HR IUD   Generic drug:  levonorgestrel      None Entered        traZODone 50 MG tablet    DESYREL    90 tablet    Take 1 tablet (50 mg) by mouth At Bedtime    Insomnia, unspecified type       venlafaxine 75 MG 24 hr capsule    EFFEXOR-XR    90 capsule    TAKE 3 CAPSULES BY MOUTH EVERY DAY    Major depressive disorder, recurrent episode, mild (H)

## 2018-07-12 ENCOUNTER — TELEPHONE (OUTPATIENT)
Dept: FAMILY MEDICINE | Facility: CLINIC | Age: 43
End: 2018-07-12

## 2018-07-12 DIAGNOSIS — G47.00 INSOMNIA, UNSPECIFIED TYPE: ICD-10-CM

## 2018-07-12 RX ORDER — TRAZODONE HYDROCHLORIDE 50 MG/1
50 TABLET, FILM COATED ORAL AT BEDTIME
Qty: 270 TABLET | Refills: 3 | COMMUNITY
Start: 2018-07-12 | End: 2020-03-10

## 2018-07-12 ASSESSMENT — ASTHMA QUESTIONNAIRES: ACT_TOTALSCORE: 25

## 2018-07-12 ASSESSMENT — PATIENT HEALTH QUESTIONNAIRE - PHQ9: SUM OF ALL RESPONSES TO PHQ QUESTIONS 1-9: 3

## 2018-07-12 NOTE — TELEPHONE ENCOUNTER
Madeline called asking if she could have a 90 day supply of medication as her cost will be much less.  I called pharmacy to give her the correct amount for a 90 day supply since Deanna had authorized refills for 1 year.

## 2018-07-16 DIAGNOSIS — F33.0 MAJOR DEPRESSIVE DISORDER, RECURRENT EPISODE, MILD (H): ICD-10-CM

## 2018-07-16 RX ORDER — VENLAFAXINE HYDROCHLORIDE 75 MG/1
CAPSULE, EXTENDED RELEASE ORAL
Qty: 270 CAPSULE | Refills: 3 | Status: SHIPPED | OUTPATIENT
Start: 2018-07-16 | End: 2019-04-23

## 2018-07-16 NOTE — TELEPHONE ENCOUNTER
Patient called in and left a message stating that we sent in the wrong prescription refill that she needed. We sent in Trazodone but it was actually Effexor that she needed.  I called her back and she states that she wanted a 90 day supply of her Effexor and not her trazodone. Routing to [facility] to send in 90 day supply with refills to [facility] to make a years worth.

## 2018-07-17 NOTE — TELEPHONE ENCOUNTER
Patient was notified of correction and stated she was able to pick it up yesterday. She had no further questions or concerns.

## 2019-03-13 ENCOUNTER — HOSPITAL ENCOUNTER (OUTPATIENT)
Dept: MAMMOGRAPHY | Facility: CLINIC | Age: 44
Discharge: HOME | End: 2019-03-13
Attending: OBSTETRICS & GYNECOLOGY | Admitting: OBSTETRICS & GYNECOLOGY
Payer: COMMERCIAL

## 2019-03-13 DIAGNOSIS — Z12.31 VISIT FOR SCREENING MAMMOGRAM: ICD-10-CM

## 2019-03-13 PROCEDURE — 77063 BREAST TOMOSYNTHESIS BI: CPT

## 2019-04-23 DIAGNOSIS — F33.0 MAJOR DEPRESSIVE DISORDER, RECURRENT EPISODE, MILD (H): ICD-10-CM

## 2019-04-23 RX ORDER — VENLAFAXINE HYDROCHLORIDE 75 MG/1
CAPSULE, EXTENDED RELEASE ORAL
Qty: 270 CAPSULE | Refills: 1 | COMMUNITY
Start: 2019-04-23

## 2019-04-23 RX ORDER — VENLAFAXINE HYDROCHLORIDE 75 MG/1
CAPSULE, EXTENDED RELEASE ORAL
Qty: 270 CAPSULE | Refills: 0 | Status: SHIPPED | OUTPATIENT
Start: 2019-04-23 | End: 2019-07-22

## 2019-04-23 NOTE — TELEPHONE ENCOUNTER
Patient called in stating that she has switched from [facility] pharmacy to [facility] mail order pharmacy and [facility] needs a new prescription sent to them. The patient has one more 90 day refill until she is due for a visit so she was informed that only a 90 day with no refills will be sent in and she expressed understanding to this. She is hoping to have this sent in today otherwise she will run out because she only has 3 days left. Routing to Dr. Lockhart, on call provider, to send in today so patient does not run out of medication.

## 2019-04-23 NOTE — TELEPHONE ENCOUNTER
Attempted to leave patient message to inform her of prescription being sent in but there was no answer.  Pharmacy should inform her of this.

## 2019-07-22 ENCOUNTER — OFFICE VISIT (OUTPATIENT)
Dept: FAMILY MEDICINE | Facility: CLINIC | Age: 44
End: 2019-07-22

## 2019-07-22 VITALS
HEART RATE: 84 BPM | DIASTOLIC BLOOD PRESSURE: 78 MMHG | BODY MASS INDEX: 26.88 KG/M2 | WEIGHT: 179.4 LBS | TEMPERATURE: 98 F | OXYGEN SATURATION: 96 % | SYSTOLIC BLOOD PRESSURE: 120 MMHG

## 2019-07-22 DIAGNOSIS — J45.20 MILD INTERMITTENT ASTHMA WITHOUT COMPLICATION: ICD-10-CM

## 2019-07-22 DIAGNOSIS — F33.0 MAJOR DEPRESSIVE DISORDER, RECURRENT EPISODE, MILD (H): Primary | ICD-10-CM

## 2019-07-22 DIAGNOSIS — J02.9 SORE THROAT: ICD-10-CM

## 2019-07-22 DIAGNOSIS — R06.83 SNORING: ICD-10-CM

## 2019-07-22 DIAGNOSIS — R53.83 OTHER FATIGUE: ICD-10-CM

## 2019-07-22 LAB
ERYTHROCYTE [DISTWIDTH] IN BLOOD BY AUTOMATED COUNT: 11.4 %
HCT VFR BLD AUTO: 43.2 % (ref 35–47)
HEMOGLOBIN: 13.7 G/DL (ref 11.7–15.7)
MCH RBC QN AUTO: 32.3 PG (ref 26–33)
MCHC RBC AUTO-ENTMCNC: 31.7 G/DL (ref 31–36)
MCV RBC AUTO: 101.9 FL (ref 78–100)
PLATELET COUNT - QUEST: 245 10^9/L (ref 150–375)
RBC # BLD AUTO: 4.24 10*12/L (ref 3.8–5.2)
S PYO AG THROAT QL IA.RAPID: NORMAL
WBC # BLD AUTO: 7.5 10*9/L (ref 4–11)

## 2019-07-22 PROCEDURE — 87880 STREP A ASSAY W/OPTIC: CPT | Performed by: PHYSICIAN ASSISTANT

## 2019-07-22 PROCEDURE — 84443 ASSAY THYROID STIM HORMONE: CPT | Mod: 90 | Performed by: PHYSICIAN ASSISTANT

## 2019-07-22 PROCEDURE — 87070 CULTURE OTHR SPECIMN AEROBIC: CPT | Performed by: PHYSICIAN ASSISTANT

## 2019-07-22 PROCEDURE — 99214 OFFICE O/P EST MOD 30 MIN: CPT | Performed by: PHYSICIAN ASSISTANT

## 2019-07-22 PROCEDURE — 85027 COMPLETE CBC AUTOMATED: CPT | Performed by: PHYSICIAN ASSISTANT

## 2019-07-22 PROCEDURE — 36415 COLL VENOUS BLD VENIPUNCTURE: CPT | Performed by: PHYSICIAN ASSISTANT

## 2019-07-22 RX ORDER — ALBUTEROL SULFATE 90 UG/1
AEROSOL, METERED RESPIRATORY (INHALATION)
Qty: 18 G | Refills: 1 | Status: SHIPPED | OUTPATIENT
Start: 2019-07-22 | End: 2021-03-05

## 2019-07-22 RX ORDER — VENLAFAXINE HYDROCHLORIDE 75 MG/1
CAPSULE, EXTENDED RELEASE ORAL
Qty: 270 CAPSULE | Refills: 3 | Status: SHIPPED | OUTPATIENT
Start: 2019-07-22 | End: 2020-08-10

## 2019-07-22 ASSESSMENT — ANXIETY QUESTIONNAIRES
7. FEELING AFRAID AS IF SOMETHING AWFUL MIGHT HAPPEN: NOT AT ALL
3. WORRYING TOO MUCH ABOUT DIFFERENT THINGS: NOT AT ALL
IF YOU CHECKED OFF ANY PROBLEMS ON THIS QUESTIONNAIRE, HOW DIFFICULT HAVE THESE PROBLEMS MADE IT FOR YOU TO DO YOUR WORK, TAKE CARE OF THINGS AT HOME, OR GET ALONG WITH OTHER PEOPLE: NOT DIFFICULT AT ALL
5. BEING SO RESTLESS THAT IT IS HARD TO SIT STILL: NOT AT ALL
6. BECOMING EASILY ANNOYED OR IRRITABLE: NOT AT ALL
2. NOT BEING ABLE TO STOP OR CONTROL WORRYING: NOT AT ALL
1. FEELING NERVOUS, ANXIOUS, OR ON EDGE: NOT AT ALL
GAD7 TOTAL SCORE: 0

## 2019-07-22 ASSESSMENT — PATIENT HEALTH QUESTIONNAIRE - PHQ9
5. POOR APPETITE OR OVEREATING: NOT AT ALL
SUM OF ALL RESPONSES TO PHQ QUESTIONS 1-9: 7

## 2019-07-22 NOTE — NURSING NOTE
Madeline is here today for a med recheck and sore throat.    Pre-visit Screening:  Immunizations:  up to date  Colonoscopy:  NA  Mammogram: is up to date  Asthma Action Test/Plan:  Done today  PHQ9:  Done today  GAD7:  Done today  Questioned patient about current smoking habits Pt. has never smoked.  Ok to leave detailed message on voice mail for today's visit only Yes, phone # [phone]

## 2019-07-22 NOTE — PROGRESS NOTES
Subjective     Madeline Schafer is a 44 year old female who presents to clinic today for the following health issues:    HPI   Depression Followup    How are you doing with your depression since your last visit? No change    Are you having other symptoms that might be associated with depression? No    Have you had a significant life event?  No     Are you feeling anxious or having panic attacks?   No    Do you have any concerns with your use of alcohol or other drugs? No     Trazadone for sleep   Rare use - < 2x weekly      Lack of energy  1-2 years  Snores  Does have some apneic episodes, waking self up     Sleep: In bed 9   Asleep at 11   Up at 6:30--8:30      BP Readings from Last 6 Encounters:   07/22/19 120/78   07/11/18 118/76   02/12/18 124/90   05/30/17 116/80   09/22/15 102/72   03/03/15 125/85         Social History     Tobacco Use     Smoking status: Never Smoker     Smokeless tobacco: Never Used   Substance Use Topics     Alcohol use: Yes     Alcohol/week: 1.8 oz     Types: 3 Standard drinks or equivalent per week     Drug use: No     PHQ 5/30/2017 7/11/2018 7/22/2019   PHQ-9 Total Score 1 3 7   Q9: Thoughts of better off dead/self-harm past 2 weeks Not at all Not at all Not at all     LISA-7 SCORE 1/25/2013 7/1/2014 7/22/2019   Total Score 4 2 -   Total Score - - 0     No flowsheet data found.  No flowsheet data found.  In the past two weeks have you had thoughts of suicide or self-harm?  No.    Do you have concerns about your personal safety or the safety of others?   No    Suicide Assessment Five-step Evaluation and Treatment (SAFE-T)  Asthma Follow-Up    Was ACT completed today?    Yes    ACT Total Scores 7/22/2019   ACT TOTAL SCORE -   ASTHMA ER VISITS -   ASTHMA HOSPITALIZATIONS -   ACT TOTAL SCORE (Goal Greater than or Equal to 20) 23   In the past 12 months, how many times did you visit the emergency room for your asthma without being admitted to the hospital? 0   In the past 12 months, how many  times were you hospitalized overnight because of your asthma? 0       How many days per week do you miss taking your asthma controller medication?  0    Please describe any recent triggers for your asthma: URI    Have you had any Emergency Room Visits, Urgent Care Visits, or Hospital Admissions since your last office visit?  No      URI SYMPTOMS      Duration: 1 week    Description  sore throat, headache, fatigue/malaise and conjunctival irritation    Severity: moderate    Accompanying signs and symptoms: None    History (predisposing factors):  none    Precipitating or alleviating factors: None      Tickle in throat Tues - sore throat worsened Friday  HA today  Bilateral ear pain.  - rhinorrhea  + redness of right eye with crusting this am. Wears glasses, no contacts.   + cough with eating/drinking  No sick contacts  Headache this am.   OTC: Advil, Mucinex      Patient Active Problem List   Diagnosis     FAMILY HX GI MALIGNANCY     Allergic rhinitis     Health Care Home     Advance care planning     Major depressive disorder, recurrent episode, mild (H)     Mild intermittent asthma without complication     Overweight     Past Surgical History:   Procedure Laterality Date     HC EXCISION LESION/TENDON-SHEATH/CAPSULE, FOOT  age 11     HCL PAP THIN LAYER SCREEN  2007    Dr Acevedo       Social History     Tobacco Use     Smoking status: Never Smoker     Smokeless tobacco: Never Used   Substance Use Topics     Alcohol use: Yes     Alcohol/week: 1.8 oz     Types: 3 Standard drinks or equivalent per week     Family History   Problem Relation Age of Onset     Lipids Mother      Dementia Mother      Lipids Father      Prostate Cancer Father         diagnosed age 62     Cancer - colorectal Maternal Grandfather          age 70 colon cancer         Current Outpatient Medications   Medication Sig Dispense Refill     albuterol (VENTOLIN HFA) 108 (90 Base) MCG/ACT inhaler INHALE 2 PUFFS INTO THE LUNGS EVERY 6 HOURS AS  NEEDED FOR SHORTNESS OF BREATH/ DYSPNEA 18 g 1     MIRENA 20 MCG/24HR IU IUD None Entered       traZODone (DESYREL) 50 MG tablet Take 1 tablet (50 mg) by mouth At Bedtime 270 tablet 3     venlafaxine (EFFEXOR-XR) 75 MG 24 hr capsule TAKE 3 CAPSULES BY MOUTH EVERY  capsule 3     Allergies   Allergen Reactions     No Known Allergies      Seasonal Allergies      No lab results found.   BP Readings from Last 3 Encounters:   07/22/19 120/78   07/11/18 118/76   02/12/18 124/90    Wt Readings from Last 3 Encounters:   07/22/19 81.4 kg (179 lb 6.4 oz)   07/11/18 79.1 kg (174 lb 6.4 oz)   02/12/18 76.9 kg (169 lb 9.6 oz)                    -------------------------------------  Reviewed and updated as needed this visit by Provider         Review of Systems   ROS COMP: Constitutional, HEENT, cardiovascular, pulmonary, gi and gu systems are negative, except as otherwise noted.      Objective    /78 (BP Location: Right arm, Patient Position: Sitting, Cuff Size: Adult Large)   Pulse 84   Temp 98  F (36.7  C) (Oral)   Wt 81.4 kg (179 lb 6.4 oz)   SpO2 96%   BMI 26.88 kg/m    Body mass index is 26.88 kg/m .  Physical Exam   GENERAL: healthy, alert and no distress  EYES: Right eye conjunctival injection but no discharge present.  Left eye normal.   HENT: ear canals and TM's normal, nose and mouth without ulcers or lesions  NECK: no adenopathy, no asymmetry, masses, or scars and thyroid normal to palpation  RESP: lungs clear to auscultation - no rales, rhonchi or wheezes  CV: regular rate and rhythm, normal S1 S2, no S3 or S4, no murmur, click or rub, no peripheral edema and peripheral pulses strong  MS: no gross musculoskeletal defects noted, no edema  SKIN: no suspicious lesions or rashes  NEURO: Normal strength and tone, mentation intact and speech normal  PSYCH: mentation appears normal, affect normal/bright    Diagnostic Test Results:  Labs reviewed in Epic  Results for orders placed or performed in visit on  07/22/19 (from the past 24 hour(s))   HEMOGRAM/PLATELET (BFP)   Result Value Ref Range    WBC 7.5 4.0 - 11 10*9/L    RBC Count 4.24 3.8 - 5.2 10*12/L    Hemoglobin 13.7 11.7 - 15.7 g/dL    Hematocrit 43.2 35.0 - 47.0 %    .9 (A) 78 - 100 fL    MCH 32.3 26 - 33 pg    MCHC 31.7 31 - 36 g/dL    RDW 11.4 %    Platelet Count 245 150 - 375 10^9/L   RAPID STREP (BFP)   Result Value Ref Range    Rapid Strep A Screen NEG neg   TSH with free T4 reflex   Result Value Ref Range    TSH 1.69 mIU/L           Assessment & Plan     1. Major depressive disorder, recurrent episode, mild (H)  stable  - venlafaxine (EFFEXOR-XR) 75 MG 24 hr capsule; TAKE 3 CAPSULES BY MOUTH EVERY DAY  Dispense: 270 capsule; Refill: 3    2. Mild intermittent asthma without complication  stable  - albuterol (VENTOLIN HFA) 108 (90 Base) MCG/ACT inhaler; INHALE 2 PUFFS INTO THE LUNGS EVERY 6 HOURS AS NEEDED FOR SHORTNESS OF BREATH/ DYSPNEA  Dispense: 18 g; Refill: 1    3. Snoring  New - advised sleep consult  - SLEEP EVALUATION & MANAGEMENT REFERRAL - [facility]  [phone] (Age 18 and up); Future    4. Other fatigue  Most likely cause is apnea  - SLEEP EVALUATION & MANAGEMENT REFERRAL - [facility]  [phone] (Age 18 and up); Future  - HEMOGRAM/PLATELET (BFP)  - VENOUS COLLECTION  - TSH with free T4 reflex    5. Sore throat  new  - RAPID STREP (BFP)  - THROAT CULTURE (BFP)       Recommend Ibuprofen or Tylenol as tolerated for pain relief. Chloraseptic, cough drops advised.   RTC with worsening sore throat, fevers, difficulty swallowing. Strep Culture pending, will call if positive.    JEFF Givens  [facility]

## 2019-07-23 LAB — TSH SERPL-ACNC: 1.69 MIU/L

## 2019-07-23 ASSESSMENT — ASTHMA QUESTIONNAIRES: ACT_TOTALSCORE: 23

## 2019-07-23 ASSESSMENT — ANXIETY QUESTIONNAIRES: GAD7 TOTAL SCORE: 0

## 2019-07-24 LAB — THROAT CULTURE: NORMAL

## 2019-11-06 ENCOUNTER — HEALTH MAINTENANCE LETTER (OUTPATIENT)
Age: 44
End: 2019-11-06

## 2020-03-10 DIAGNOSIS — G47.00 INSOMNIA, UNSPECIFIED TYPE: ICD-10-CM

## 2020-03-10 RX ORDER — TRAZODONE HYDROCHLORIDE 50 MG/1
50 TABLET, FILM COATED ORAL AT BEDTIME
Qty: 90 TABLET | Refills: 0 | Status: SHIPPED | OUTPATIENT
Start: 2020-03-10 | End: 2020-08-10

## 2020-03-10 NOTE — TELEPHONE ENCOUNTER
Patient called in and left a message for Deanna asking for a refill of   Pending Prescriptions:                       Disp   Refills    traZODone (DESYREL) 50 MG tablet          270 ta*3            Sig: Take 1 tablet (50 mg) by mouth At Bedtime    Patient last had a refill of this medication on 7/12/18 but was last seen for a medication check and review on 7/22/19. Routing to Deanna for approval or denial.

## 2020-08-03 ENCOUNTER — TELEPHONE (OUTPATIENT)
Dept: FAMILY MEDICINE | Facility: CLINIC | Age: 45
End: 2020-08-03

## 2020-08-08 NOTE — PROGRESS NOTES
"Madeline Schafer is a 45 year old female who is being evaluated via a billable video visit.      Video Start Time: 9:14    The patient has been notified of following:     \"This video visit will be conducted via a call between you and your physician/provider. We have found that certain health care needs can be provided without the need for an in-person physical exam.  This service lets us provide the care you need with a video conversation.  If a prescription is necessary we can send it directly to your pharmacy.  If lab work is needed we can place an order for that and you can then stop by our lab to have the test done at a later time.    If during the course of the call the physician/provider feels a video visit is not appropriate, you will not be charged for this service.\"     Physician has received verbal consent for a Video Visit from the patient? Yes    Patient would like the video invitation sent by: Other e-mail: Carie Owens     Madeline Schafer is a 45 year old female who presents to clinic today for the following health issues:    HPI       Health Maintenance Due   Topic Date Due     PREVENTIVE CARE VISIT  1975     DEPRESSION ACTION PLAN  1975     HIV SCREENING  06/05/1988     PAP  06/05/1994     LIPID  06/05/2018     ASTHMA ACTION PLAN  07/11/2019     ASTHMA CONTROL TEST  01/22/2020     PHQ-9  01/22/2020       Depression Followup    How are you doing with your depression since your last visit? No change    Are you having other symptoms that might be associated with depression? No    Have you had a significant life event?  No     Are you feeling anxious or having panic attacks?   No    Do you have any concerns with your use of alcohol or other drugs? No     Taking Trazodone at night time.     Fatigue for years  Taking intermittent Phentermine from [email or web address]   Helps mood and weight loss.       Asthma well controlled.       Social History     Tobacco Use     Smoking status: Never " Smoker     Smokeless tobacco: Never Used   Substance Use Topics     Alcohol use: Yes     Alcohol/week: 3.0 standard drinks     Types: 3 Standard drinks or equivalent per week     Drug use: No     PHQ 2017   PHQ-9 Total Score 1 3 7   Q9: Thoughts of better off dead/self-harm past 2 weeks Not at all Not at all Not at all     LISA-7 SCORE 2013   Total Score 4 2 -   Total Score - - 0         Suicide Assessment Five-step Evaluation and Treatment (SAFE-T)    Asthma Follow-Up    Was ACT completed today? No will email        Patient Active Problem List   Diagnosis     FAMILY HX GI MALIGNANCY     Allergic rhinitis     Health Care Home     Advance care planning     Major depressive disorder, recurrent episode, mild (H)     Mild intermittent asthma without complication     Overweight     Past Surgical History:   Procedure Laterality Date     HC EXCISION LESION/TENDON-SHEATH/CAPSULE, FOOT  age 11     HCL PAP THIN LAYER SCREEN  2007    Dr Acevedo       Social History     Tobacco Use     Smoking status: Never Smoker     Smokeless tobacco: Never Used   Substance Use Topics     Alcohol use: Yes     Alcohol/week: 3.0 standard drinks     Types: 3 Standard drinks or equivalent per week     Family History   Problem Relation Age of Onset     Lipids Mother      Dementia Mother      Lipids Father      Prostate Cancer Father         diagnosed age 62     Cancer - colorectal Maternal Grandfather          age 70 colon cancer           Current Outpatient Medications   Medication Sig Dispense Refill     buPROPion (WELLBUTRIN XL) 150 MG 24 hr tablet Take 1 tablet (150 mg) by mouth every morning 30 tablet 1     phentermine (ADIPEX-P) 30 MG capsule Take by mouth every morning       traZODone (DESYREL) 50 MG tablet Take 1 tablet (50 mg) by mouth At Bedtime 90 tablet 3     venlafaxine (EFFEXOR-XR) 75 MG 24 hr capsule TAKE 3 CAPSULES BY MOUTH EVERY  capsule 3     albuterol (VENTOLIN HFA) 108  "(90 Base) MCG/ACT inhaler INHALE 2 PUFFS INTO THE LUNGS EVERY 6 HOURS AS NEEDED FOR SHORTNESS OF BREATH/ DYSPNEA 18 g 1     MIRENA 20 MCG/24HR IU IUD None Entered       Allergies   Allergen Reactions     No Known Allergies      Seasonal Allergies      Recent Labs   Lab Test 07/22/19  1701   TSH 1.69        BP Readings from Last 3 Encounters:   07/22/19 120/78   07/11/18 118/76   02/12/18 124/90    Wt Readings from Last 3 Encounters:   07/22/19 81.4 kg (179 lb 6.4 oz)   07/11/18 79.1 kg (174 lb 6.4 oz)   02/12/18 76.9 kg (169 lb 9.6 oz)                         Review of Systems   Constitutional, HEENT, cardiovascular, pulmonary, gi and gu systems are negative, except as otherwise noted.      Objective    There were no vitals taken for this visit.  Estimated body mass index is 26.88 kg/m  as calculated from the following:    Height as of 9/22/15: 1.74 m (5' 8.5\").    Weight as of 7/22/19: 81.4 kg (179 lb 6.4 oz).  Physical Exam     Mental Status Exam:   Appearance: calm  Grooming: adequately groomed  Demeanor: engaged, cooperative  Affect: normal  Speech: Normal.  Gait:Normal.  Movements: Normal  Form of thought: Logical, Linear and Goal directed  Thought content:  Normal  Insight:Good   Judgment: Good   Cognition: Good       Assessment & Plan     1. Major depressive disorder, recurrent episode, mild (H)  Add Wellbutrin  I reviewed the patient information handout from Up To Date on this medication including side effects with the patient.    - buPROPion (WELLBUTRIN XL) 150 MG 24 hr tablet; Take 1 tablet (150 mg) by mouth every morning  Dispense: 30 tablet; Refill: 1  - venlafaxine (EFFEXOR-XR) 75 MG 24 hr capsule; TAKE 3 CAPSULES BY MOUTH EVERY DAY  Dispense: 270 capsule; Refill: 3    2. Mild intermittent asthma without complication  OK for albuterol refills    3. Insomnia, unspecified type    - traZODone (DESYREL) 50 MG tablet; Take 1 tablet (50 mg) by mouth At Bedtime  Dispense: 90 tablet; Refill: 3    4. " Overweight  Referred to [facility] for fatigue.   Advised I do not prescribe Phentermine  - ENDOCRINOLOGY ADULT REFERRAL         FUTURE APPOINTMENTS:       - Follow-up visit in 2 weeks via mychart    Taking Vit D daily        JEFF Givens  [facility]        Video-Visit Details    Type of service:  Video Visit    Video End Time (time video stopped): 9:34    Originating Location (pt. Location): Home    Distant Location (provider location):  [facility]     Mode of Communication:  Zoom          Virtual Visit Coding: Established Patient  17420 = 10 min  25122 = 15 min  84357 = 25 min  49835 = 40 min

## 2020-08-10 ENCOUNTER — OFFICE VISIT (OUTPATIENT)
Dept: FAMILY MEDICINE | Facility: CLINIC | Age: 45
End: 2020-08-10

## 2020-08-10 DIAGNOSIS — G47.00 INSOMNIA, UNSPECIFIED TYPE: ICD-10-CM

## 2020-08-10 DIAGNOSIS — E66.3 OVERWEIGHT: ICD-10-CM

## 2020-08-10 DIAGNOSIS — F33.0 MAJOR DEPRESSIVE DISORDER, RECURRENT EPISODE, MILD (H): Primary | ICD-10-CM

## 2020-08-10 DIAGNOSIS — J45.20 MILD INTERMITTENT ASTHMA WITHOUT COMPLICATION: ICD-10-CM

## 2020-08-10 PROCEDURE — 99213 OFFICE O/P EST LOW 20 MIN: CPT | Performed by: PHYSICIAN ASSISTANT

## 2020-08-10 RX ORDER — VENLAFAXINE HYDROCHLORIDE 75 MG/1
CAPSULE, EXTENDED RELEASE ORAL
Qty: 270 CAPSULE | Refills: 3 | Status: SHIPPED | OUTPATIENT
Start: 2020-08-10 | End: 2021-03-05

## 2020-08-10 RX ORDER — TRAZODONE HYDROCHLORIDE 50 MG/1
50 TABLET, FILM COATED ORAL AT BEDTIME
Qty: 90 TABLET | Refills: 3 | Status: SHIPPED | OUTPATIENT
Start: 2020-08-10 | End: 2021-03-05

## 2020-08-10 RX ORDER — PHENTERMINE HYDROCHLORIDE 30 MG/1
CAPSULE ORAL EVERY MORNING
COMMUNITY
Start: 2020-08-10 | End: 2021-03-05

## 2020-08-10 RX ORDER — BUPROPION HYDROCHLORIDE 150 MG/1
150 TABLET ORAL EVERY MORNING
Qty: 30 TABLET | Refills: 1 | Status: SHIPPED | OUTPATIENT
Start: 2020-08-10 | End: 2020-09-11 | Stop reason: DRUGHIGH

## 2020-08-28 ENCOUNTER — MYC MEDICAL ADVICE (OUTPATIENT)
Dept: FAMILY MEDICINE | Facility: CLINIC | Age: 45
End: 2020-08-28

## 2020-09-09 ENCOUNTER — MYC MEDICAL ADVICE (OUTPATIENT)
Dept: FAMILY MEDICINE | Facility: CLINIC | Age: 45
End: 2020-09-09

## 2020-09-10 ENCOUNTER — MYC MEDICAL ADVICE (OUTPATIENT)
Dept: FAMILY MEDICINE | Facility: CLINIC | Age: 45
End: 2020-09-10

## 2020-09-10 DIAGNOSIS — F33.0 MAJOR DEPRESSIVE DISORDER, RECURRENT EPISODE, MILD (H): Primary | ICD-10-CM

## 2020-09-11 RX ORDER — BUPROPION HYDROCHLORIDE 300 MG/1
300 TABLET ORAL EVERY MORNING
Qty: 90 TABLET | Refills: 0 | Status: SHIPPED | OUTPATIENT
Start: 2020-09-11 | End: 2020-11-16

## 2020-10-06 ENCOUNTER — MYC MEDICAL ADVICE (OUTPATIENT)
Dept: FAMILY MEDICINE | Facility: CLINIC | Age: 45
End: 2020-10-06

## 2020-10-23 ENCOUNTER — TRANSFERRED RECORDS (OUTPATIENT)
Dept: FAMILY MEDICINE | Facility: CLINIC | Age: 45
End: 2020-10-23

## 2020-11-13 DIAGNOSIS — F33.0 MAJOR DEPRESSIVE DISORDER, RECURRENT EPISODE, MILD (H): ICD-10-CM

## 2020-11-16 RX ORDER — BUPROPION HYDROCHLORIDE 300 MG/1
TABLET ORAL
Qty: 90 TABLET | Refills: 0 | Status: SHIPPED | OUTPATIENT
Start: 2020-11-16 | End: 2021-03-05

## 2020-11-16 NOTE — TELEPHONE ENCOUNTER
Please call pt   I sent in 3 months of Wellbutrin  Will Need OV (telemed is ok) in a bout 10 weeks    Deanna Dumont PA-C  11/16/2020

## 2020-11-16 NOTE — TELEPHONE ENCOUNTER
Madeline Schafer is requesting a refill of:    Pending Prescriptions:                       Disp   Refills    buPROPion (WELLBUTRIN XL) 300 MG 24 hr ta*90 tab*0            Sig: TAKE 1 TABLET BY MOUTH IN THE MORNING    Please close encounter if RX was sent. Thanks, Mala

## 2020-11-29 ENCOUNTER — HEALTH MAINTENANCE LETTER (OUTPATIENT)
Age: 45
End: 2020-11-29

## 2021-02-14 ENCOUNTER — HEALTH MAINTENANCE LETTER (OUTPATIENT)
Age: 46
End: 2021-02-14

## 2021-03-02 DIAGNOSIS — F33.0 MAJOR DEPRESSIVE DISORDER, RECURRENT EPISODE, MILD (H): ICD-10-CM

## 2021-03-02 RX ORDER — BUPROPION HYDROCHLORIDE 300 MG/1
TABLET ORAL
Qty: 90 TABLET | Refills: 0 | COMMUNITY
Start: 2021-03-02

## 2021-03-02 NOTE — TELEPHONE ENCOUNTER
Transferred to the front to make virtual visit with CER.     Refused Prescriptions:                       Disp   Refills    buPROPion (WELLBUTRIN XL) 300 MG 24 hr tab*90 tab*0        Sig: TAKE 1 TABLET BY MOUTH IN THE MORNING  Refused By: SUSANA TRUONG  Reason for Refusal: Patient needs appointment

## 2021-03-05 ENCOUNTER — OFFICE VISIT (OUTPATIENT)
Dept: FAMILY MEDICINE | Facility: CLINIC | Age: 46
End: 2021-03-05

## 2021-03-05 DIAGNOSIS — F33.0 MAJOR DEPRESSIVE DISORDER, RECURRENT EPISODE, MILD (H): Primary | ICD-10-CM

## 2021-03-05 DIAGNOSIS — G47.00 INSOMNIA, UNSPECIFIED TYPE: ICD-10-CM

## 2021-03-05 DIAGNOSIS — J45.20 MILD INTERMITTENT ASTHMA WITHOUT COMPLICATION: ICD-10-CM

## 2021-03-05 PROCEDURE — 99213 OFFICE O/P EST LOW 20 MIN: CPT | Mod: 95 | Performed by: PHYSICIAN ASSISTANT

## 2021-03-05 RX ORDER — TRAZODONE HYDROCHLORIDE 50 MG/1
50 TABLET, FILM COATED ORAL AT BEDTIME
Qty: 90 TABLET | Refills: 3 | Status: SHIPPED | OUTPATIENT
Start: 2021-03-05 | End: 2022-04-21

## 2021-03-05 RX ORDER — BUPROPION HYDROCHLORIDE 300 MG/1
TABLET ORAL
Qty: 90 TABLET | Refills: 3 | Status: SHIPPED | OUTPATIENT
Start: 2021-03-05 | End: 2022-03-30

## 2021-03-05 RX ORDER — ALBUTEROL SULFATE 90 UG/1
AEROSOL, METERED RESPIRATORY (INHALATION)
Qty: 18 G | Refills: 1 | Status: SHIPPED | OUTPATIENT
Start: 2021-03-05 | End: 2022-04-21

## 2021-03-05 RX ORDER — VENLAFAXINE HYDROCHLORIDE 75 MG/1
CAPSULE, EXTENDED RELEASE ORAL
Qty: 270 CAPSULE | Refills: 3 | Status: SHIPPED | OUTPATIENT
Start: 2021-03-05 | End: 2022-03-30

## 2021-03-05 ASSESSMENT — ANXIETY QUESTIONNAIRES
7. FEELING AFRAID AS IF SOMETHING AWFUL MIGHT HAPPEN: NOT AT ALL
6. BECOMING EASILY ANNOYED OR IRRITABLE: NOT AT ALL
5. BEING SO RESTLESS THAT IT IS HARD TO SIT STILL: NOT AT ALL
GAD7 TOTAL SCORE: 0
1. FEELING NERVOUS, ANXIOUS, OR ON EDGE: NOT AT ALL
3. WORRYING TOO MUCH ABOUT DIFFERENT THINGS: NOT AT ALL
2. NOT BEING ABLE TO STOP OR CONTROL WORRYING: NOT AT ALL
IF YOU CHECKED OFF ANY PROBLEMS ON THIS QUESTIONNAIRE, HOW DIFFICULT HAVE THESE PROBLEMS MADE IT FOR YOU TO DO YOUR WORK, TAKE CARE OF THINGS AT HOME, OR GET ALONG WITH OTHER PEOPLE: NOT DIFFICULT AT ALL

## 2021-03-05 ASSESSMENT — PATIENT HEALTH QUESTIONNAIRE - PHQ9
SUM OF ALL RESPONSES TO PHQ QUESTIONS 1-9: 3
5. POOR APPETITE OR OVEREATING: NOT AT ALL

## 2021-03-05 NOTE — NURSING NOTE
Madeline is having a virtual visit today to discuss her medications.        Pre-visit Screening:  Immunizations:  up to date  Colonoscopy:  NA  Mammogram: is up to date-- awaiting records  Asthma Action Test/Plan:  Done today  PHQ9:  Done today  GAD7:  Done today  Questioned patient about current smoking habits Pt. has never smoked.  Ok to leave detailed message on voice mail for today's visit only Yes, phone # [phone]

## 2021-03-05 NOTE — PROGRESS NOTES
Madeline Schafer is a 45 year old female who is being evaluated via a billable video visit (audio/video synchronous).     Video Start Time: 12:10pm    Verbal consent obtained to complete telemed appt.    Originating Location (pt. Location): Home    Distant Location (provider location): [facility] office      Subjective     Madeline Schafer is a 45 year old female who presents today for the following health issues:    HPI       Follow-up on depression - doing very well. Working in real estate, working from home.  Depression improved in the last 6 months.  Venlfaxine - switched to at bedtime dosing. Less fatigue taken this way  She state Wellbutrin has been a great addition.  Not seeing therapist.       PHQ-9 SCORE 7/22/2019 8/10/2020 3/5/2021   PHQ-9 Total Score - - -   PHQ-9 Total Score MyChart - 8 (Mild depression) -   PHQ-9 Total Score 7 8 3         LISA-7 SCORE 7/22/2019 8/10/2020 3/5/2021   Total Score - - -   Total Score - 0 (minimal anxiety) -   Total Score 0 0 0           She started working out in Sept, lost 22 lbs.      Has mild intermittent asthma  Exercise induced    ACT Total Scores 7/11/2018 7/22/2019 3/5/2021   ACT TOTAL SCORE - - -   ASTHMA ER VISITS - - -   ASTHMA HOSPITALIZATIONS - - -   ACT TOTAL SCORE (Goal Greater than or Equal to 20) 25 23 23   In the past 12 months, how many times did you visit the emergency room for your asthma without being admitted to the hospital? 0 0 0   In the past 12 months, how many times were you hospitalized overnight because of your asthma? 0 0 0         Patient Active Problem List   Diagnosis     FAMILY HX GI MALIGNANCY     Allergic rhinitis     Health Care Home     Advance care planning     Major depressive disorder, recurrent episode, mild (H)     Mild intermittent asthma without complication     Overweight     Past Surgical History:   Procedure Laterality Date     HC EXCISION LESION/TENDON-SHEATH/CAPSULE, FOOT  age 11     HCL PAP THIN LAYER  SCREEN  2007    Dr Acevedo       Social History     Tobacco Use     Smoking status: Never Smoker     Smokeless tobacco: Never Used   Substance Use Topics     Alcohol use: Yes     Alcohol/week: 3.0 standard drinks     Types: 3 Standard drinks or equivalent per week     Family History   Problem Relation Age of Onset     Lipids Mother      Dementia Mother      Lipids Father      Prostate Cancer Father         diagnosed age 62     Cancer - colorectal Maternal Grandfather          age 70 colon cancer           Current Outpatient Medications   Medication Sig Dispense Refill     albuterol (VENTOLIN HFA) 108 (90 Base) MCG/ACT inhaler INHALE 2 PUFFS INTO THE LUNGS EVERY 6 HOURS AS NEEDED FOR SHORTNESS OF BREATH/ DYSPNEA 18 g 1     buPROPion (WELLBUTRIN XL) 300 MG 24 hr tablet TAKE 1 TABLET BY MOUTH IN THE MORNING 90 tablet 3     MIRENA 20 MCG/24HR IU IUD None Entered       traZODone (DESYREL) 50 MG tablet Take 1 tablet (50 mg) by mouth At Bedtime 90 tablet 3     venlafaxine (EFFEXOR-XR) 75 MG 24 hr capsule TAKE 3 CAPSULES BY MOUTH EVERY  capsule 3     Allergies   Allergen Reactions     No Known Allergies      Seasonal Allergies      Recent Labs   Lab Test 19  1701   TSH 1.69        BP Readings from Last 3 Encounters:   19 120/78   18 118/76   18 124/90    Wt Readings from Last 3 Encounters:   19 81.4 kg (179 lb 6.4 oz)   18 79.1 kg (174 lb 6.4 oz)   18 76.9 kg (169 lb 9.6 oz)                     Physical Exam       GENERAL: Healthy, alert and no distress  EYES: Eyes grossly normal to inspection.  No discharge or erythema, or obvious scleral/conjunctival abnormalities.  RESP: No audible wheeze, cough, or visible cyanosis.  No visible retractions or increased work of breathing.    SKIN: Visible skin clear. No significant rash, abnormal pigmentation or lesions.  NEURO: Cranial nerves grossly intact.  Mentation and speech appropriate for age.    Mental Status Exam:    Appearance: calm  Grooming: adequately groomed  Demeanor: engaged, cooperative  Affect: normal  Speech: Normal.  Gait:Normal.  Movements: Normal  Form of thought: Logical, Linear and Goal directed  Thought content:  Normal  Insight:Good   Judgment: Good   Cognition: Good          Diagnostic Test Results:  Labs reviewed in Epic          Assessment & Plan     Major depressive disorder, recurrent episode, mild (H)  Controlled.   Continue current medication(s) at current dose(s).    - buPROPion (WELLBUTRIN XL) 300 MG 24 hr tablet; TAKE 1 TABLET BY MOUTH IN THE MORNING  - venlafaxine (EFFEXOR-XR) 75 MG 24 hr capsule; TAKE 3 CAPSULES BY MOUTH EVERY DAY    Insomnia, unspecified type  Controlled.   Continue current medication(s) at current dose(s).    - traZODone (DESYREL) 50 MG tablet; Take 1 tablet (50 mg) by mouth At Bedtime    Mild intermittent asthma without complication  Controlled.   Continue current medication(s) at current dose(s).  Trial of albuterol 15 min prior to exercise.   - albuterol (VENTOLIN HFA) 108 (90 Base) MCG/ACT inhaler; INHALE 2 PUFFS INTO THE LUNGS EVERY 6 HOURS AS NEEDED FOR SHORTNESS OF BREATH/ DYSPNEA      Follow-up 1 year  Have OBGYN fax records to me to review glucose/lipids.         JEFF Givens  [facility]        Video-Visit Details    Type of service:  Video Visit (audio/video)    Video End Time (time video stopped): 12:20     Mode of Communication:  Doximity

## 2021-03-06 ASSESSMENT — ASTHMA QUESTIONNAIRES: ACT_TOTALSCORE: 23

## 2021-03-06 ASSESSMENT — ANXIETY QUESTIONNAIRES: GAD7 TOTAL SCORE: 0

## 2021-04-07 ENCOUNTER — IMMUNIZATION (OUTPATIENT)
Dept: NURSING | Facility: CLINIC | Age: 46
End: 2021-04-07
Payer: COMMERCIAL

## 2021-04-07 PROCEDURE — 91300 PR COVID VAC PFIZER DIL RECON 30 MCG/0.3 ML IM: CPT

## 2021-04-07 PROCEDURE — 0001A PR COVID VAC PFIZER DIL RECON 30 MCG/0.3 ML IM: CPT

## 2021-04-28 ENCOUNTER — IMMUNIZATION (OUTPATIENT)
Dept: NURSING | Facility: CLINIC | Age: 46
End: 2021-04-28
Attending: INTERNAL MEDICINE
Payer: COMMERCIAL

## 2021-04-28 PROCEDURE — 0002A PR COVID VAC PFIZER DIL RECON 30 MCG/0.3 ML IM: CPT

## 2021-04-28 PROCEDURE — 91300 PR COVID VAC PFIZER DIL RECON 30 MCG/0.3 ML IM: CPT

## 2021-09-19 ENCOUNTER — HEALTH MAINTENANCE LETTER (OUTPATIENT)
Age: 46
End: 2021-09-19

## 2021-11-08 ENCOUNTER — OFFICE VISIT (OUTPATIENT)
Dept: FAMILY MEDICINE | Facility: CLINIC | Age: 46
End: 2021-11-08

## 2021-11-08 VITALS
OXYGEN SATURATION: 99 % | HEIGHT: 69 IN | WEIGHT: 163 LBS | DIASTOLIC BLOOD PRESSURE: 72 MMHG | HEART RATE: 96 BPM | BODY MASS INDEX: 24.14 KG/M2 | SYSTOLIC BLOOD PRESSURE: 114 MMHG | TEMPERATURE: 98.4 F

## 2021-11-08 DIAGNOSIS — R05.9 COUGH: Primary | ICD-10-CM

## 2021-11-08 DIAGNOSIS — U07.1 INFECTION DUE TO 2019 NOVEL CORONAVIRUS: ICD-10-CM

## 2021-11-08 LAB — COVID-19: POSITIVE

## 2021-11-08 PROCEDURE — 87635 SARS-COV-2 COVID-19 AMP PRB: CPT | Performed by: FAMILY MEDICINE

## 2021-11-08 PROCEDURE — 99214 OFFICE O/P EST MOD 30 MIN: CPT | Performed by: FAMILY MEDICINE

## 2021-11-08 ASSESSMENT — MIFFLIN-ST. JEOR: SCORE: 1425.8

## 2021-11-08 NOTE — PROGRESS NOTES
Assessment & Plan   Problem List Items Addressed This Visit        Infectious/Inflammatory    Infection due to 2019 novel coronavirus      Other Visit Diagnoses     Cough    -  Primary    Relevant Orders    COVID-19 ([facility]) (Completed)         1. Cough    - COVID-19 ([facility])    2. Infection due to 2019 novel coronavirus  Positive covid result. Self isolate, continue to treat symptoms. If becomes short of breath or worsening symptoms, go immediately to the ER.    If you have been tested for COVID-19 or have concerns about it:    Stay home and away from others:    If possible, stay away from others, especially people who are at  higher risk for getting very sick from COVID-19, such as older  adults and people with other medical conditions.    If you have been in contact with someone with COVID-19, stay home   and away from others for 14 days after your last contact with that person.   Follow the recommendations of your local public health  department if you need to quarantine.    If you have a fever, cough or other symptoms of COVID-19, stay home   and away from others (except to get medical care).    Monitor your health:    Watch for fever, cough, shortness of breath, or other  symptoms of COVID-19.    Remember, symptoms may appear 2-14 days after exposure  to COVID-19 and can include:      Fever or chills    Cough    Shortness of breath or  difficulty breathing    Tiredness    Muscle or body aches    Headache    New loss of taste or  smell    Sore throat    Congestion or runny  nose    Nausea or vomiting    Diarrhea    Call your clinic if your symptoms change or worsen.  You can also reference up to date information on the following websites regarding COVID-19:    https://www.cdc.gov/coronavirus/2019-ncov/index.html  https://[place].gov/covid19/           FUTURE APPOINTMENTS:       - Follow-up visit as needed.    No follow-ups on file.    Radha Olsen MD  [facility]    Subjective     Nursing Notes:  "  Nellie Encarnacion, [facility]  11/8/2021  2:52 PM  Signed  Chief Complaint   Patient presents with     URI     cough started on Friday, chest pain/ heaviness, headache started yesterday, facial pressure, using advil and mucinex, son had covid a few weeks ago      Pre-visit Screening:  Immunizations:  up to date  Colonoscopy:  NA  Mammogram: is up to date  Asthma Action Test/Plan:  NA  PHQ9:  NA  GAD7:  NA  Questioned patient about current smoking habits Pt. has never smoked.  Ok to leave detailed message on voice mail for today's visit only Yes, phone # [phone]           Madeline Schafer is a 46 year old female who presents to clinic today for the following health issues   HPI     Has had covid vaccine  Here with cough since Friday, no sweats or fevers or chills. Has some chest discomfort with cough and phlegm that feels like a respiratory infection. Son had covid and was sent back to school about 2 weeks ago. No other covid exposures.  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        Review of Systems   Constitutional, HEENT, cardiovascular, pulmonary, gi and gu systems are negative, except as otherwise noted.      Objective    /72 (BP Location: Left arm, Patient Position: Sitting, Cuff Size: Adult Regular)   Pulse 96   Temp 98.4  F (36.9  C) (Temporal)   Ht 1.74 m (5' 8.5\")   Wt 73.9 kg (163 lb)   SpO2 99%   BMI 24.42 kg/m    Body mass index is 24.42 kg/m .  Physical Exam   GENERAL: healthy, alert and no distress  EYES: Eyes grossly normal to inspection, PERRL and conjunctivae and sclerae normal  HENT: ear canals and TM's normal, nose and mouth without ulcers or lesions  NECK: no adenopathy, no asymmetry, masses, or scars and thyroid normal to palpation  RESP: lungs clear to auscultation - no rales, rhonchi or wheezes  CV: regular rate and rhythm, normal S1 S2, no S3 or S4, no murmur, click or rub, no peripheral edema and peripheral pulses strong  ABDOMEN: soft, nontender, no hepatosplenomegaly, no masses and bowel " sounds normal  MS: no gross musculoskeletal defects noted, no edema  NEURO: Normal strength and tone, mentation intact and speech normal  PSYCH: mentation appears normal, affect normal/bright    Results for orders placed or performed in visit on 11/08/21   COVID-19 ([facility])     Status: Abnormal   Result Value Ref Range    COVID-19 Positive (A)

## 2021-11-08 NOTE — NURSING NOTE
Chief Complaint   Patient presents with     URI     cough started on Friday, chest pain/ heaviness, headache started yesterday, facial pressure, using advil and mucinex, son had covid a few weeks ago      Pre-visit Screening:  Immunizations:  up to date  Colonoscopy:  NA  Mammogram: is up to date  Asthma Action Test/Plan:  NA  PHQ9:  NA  GAD7:  NA  Questioned patient about current smoking habits Pt. has never smoked.  Ok to leave detailed message on voice mail for today's visit only Yes, phone # [phone]

## 2021-11-08 NOTE — PATIENT INSTRUCTIONS
Assessment & Plan   Problem List Items Addressed This Visit        Infectious/Inflammatory    Infection due to 2019 novel coronavirus      Other Visit Diagnoses     Cough    -  Primary    Relevant Orders    COVID-19 ([facility]) (Completed)         1. Cough    - COVID-19 ([facility])    2. Infection due to 2019 novel coronavirus  Positive covid result. Self isolate, continue to treat symptoms. If becomes short of breath or worsening symptoms, go immediately to the ER.    If you have been tested for COVID-19 or have concerns about it:    Stay home and away from others:    If possible, stay away from others, especially people who are at  higher risk for getting very sick from COVID-19, such as older  adults and people with other medical conditions.    If you have been in contact with someone with COVID-19, stay home   and away from others for 14 days after your last contact with that person.   Follow the recommendations of your local public health  department if you need to quarantine.    If you have a fever, cough or other symptoms of COVID-19, stay home   and away from others (except to get medical care).    Monitor your health:    Watch for fever, cough, shortness of breath, or other  symptoms of COVID-19.    Remember, symptoms may appear 2-14 days after exposure  to COVID-19 and can include:      Fever or chills    Cough    Shortness of breath or  difficulty breathing    Tiredness    Muscle or body aches    Headache    New loss of taste or  smell    Sore throat    Congestion or runny  nose    Nausea or vomiting    Diarrhea    Call your clinic if your symptoms change or worsen.  You can also reference up to date information on the following websites regarding COVID-19:    https://www.cdc.gov/coronavirus/2019-ncov/index.html  https://[place].gov/covid19/           FUTURE APPOINTMENTS:       - Follow-up visit as needed.    No follow-ups on file.    Radha Olsen MD  [facility]

## 2021-11-16 ENCOUNTER — TELEPHONE (OUTPATIENT)
Dept: FAMILY MEDICINE | Facility: CLINIC | Age: 46
End: 2021-11-16

## 2021-11-16 NOTE — TELEPHONE ENCOUNTER
This is viral and an antibiotic won't help. Continue to treat symptoms with otc medications.   Can try afrin nasal spray, sudafed, mucinex, tylenol.

## 2021-11-16 NOTE — TELEPHONE ENCOUNTER
Patient called in and left a message for Dr. Olsen asking for a prescription for a Zpack. She does have COVID but states that she feels she knows she has a sinus infection as well. She has a history of these and feels that hers is starting to get worse at this point. Routing to Dr. Olsen for review, are you willing to give the patient this or do you want her to come back in?      Patient can be reached at [phone]

## 2022-01-09 ENCOUNTER — HEALTH MAINTENANCE LETTER (OUTPATIENT)
Age: 47
End: 2022-01-09

## 2022-02-21 DIAGNOSIS — J45.20 MILD INTERMITTENT ASTHMA WITHOUT COMPLICATION: ICD-10-CM

## 2022-02-22 RX ORDER — ALBUTEROL SULFATE 90 UG/1
AEROSOL, METERED RESPIRATORY (INHALATION)
Qty: 36 G | COMMUNITY
Start: 2022-02-22

## 2022-02-22 NOTE — TELEPHONE ENCOUNTER
Will need OV in March, should come fasting to check lipids.    Refused Prescriptions:                       Disp   Refills    albuterol (PROAIR HFA/PROVENTIL HFA/VENTOL*36 g            Sig: INHALE 2 PUFFS INTO THE LUNGS EVERY 6 HOURS AS NEEDED           FOR SHORTNESS OF BREATH  Refused By: SUSANA TRUONG  Reason for Refusal: Patient needs appointment

## 2022-03-06 ENCOUNTER — HEALTH MAINTENANCE LETTER (OUTPATIENT)
Age: 47
End: 2022-03-06

## 2022-03-30 ENCOUNTER — TELEPHONE (OUTPATIENT)
Dept: FAMILY MEDICINE | Facility: CLINIC | Age: 47
End: 2022-03-30

## 2022-03-30 DIAGNOSIS — F33.0 MAJOR DEPRESSIVE DISORDER, RECURRENT EPISODE, MILD (H): ICD-10-CM

## 2022-03-30 RX ORDER — BUPROPION HYDROCHLORIDE 300 MG/1
TABLET ORAL
Qty: 30 TABLET | Refills: 0 | Status: SHIPPED | OUTPATIENT
Start: 2022-03-30 | End: 2022-04-21

## 2022-03-30 RX ORDER — VENLAFAXINE HYDROCHLORIDE 75 MG/1
CAPSULE, EXTENDED RELEASE ORAL
Qty: 90 CAPSULE | Refills: 0 | Status: SHIPPED | OUTPATIENT
Start: 2022-03-30 | End: 2022-04-21 | Stop reason: DRUGHIGH

## 2022-03-30 NOTE — TELEPHONE ENCOUNTER
Pt called stating she has an appt scheduled for 04/21/22. Needs short extension of medication until then. Please send for CER.    Madeline Castillo is requesting a refill of:    Pending Prescriptions:                       Disp   Refills    venlafaxine (EFFEXOR-XR) 75 MG 24 hr caps*90 cap*0            Sig: TAKE 3 CAPSULES BY MOUTH EVERY DAY    buPROPion (WELLBUTRIN XL) 300 MG 24 hr ta*30 tab*0            Sig: TAKE 1 TABLET BY MOUTH IN THE MORNING

## 2022-04-12 ENCOUNTER — HOSPITAL ENCOUNTER (OUTPATIENT)
Dept: MAMMOGRAPHY | Facility: CLINIC | Age: 47
Discharge: HOME | End: 2022-04-12
Attending: OBSTETRICS & GYNECOLOGY | Admitting: OBSTETRICS & GYNECOLOGY
Payer: COMMERCIAL

## 2022-04-12 DIAGNOSIS — Z12.31 VISIT FOR SCREENING MAMMOGRAM: ICD-10-CM

## 2022-04-12 PROCEDURE — 77067 SCR MAMMO BI INCL CAD: CPT

## 2022-04-21 ENCOUNTER — OFFICE VISIT (OUTPATIENT)
Dept: FAMILY MEDICINE | Facility: CLINIC | Age: 47
End: 2022-04-21

## 2022-04-21 ENCOUNTER — TRANSFERRED RECORDS (OUTPATIENT)
Dept: FAMILY MEDICINE | Facility: CLINIC | Age: 47
End: 2022-04-21

## 2022-04-21 VITALS
DIASTOLIC BLOOD PRESSURE: 72 MMHG | WEIGHT: 164.6 LBS | HEIGHT: 69 IN | HEART RATE: 90 BPM | OXYGEN SATURATION: 96 % | SYSTOLIC BLOOD PRESSURE: 112 MMHG | BODY MASS INDEX: 24.38 KG/M2 | TEMPERATURE: 98.3 F

## 2022-04-21 DIAGNOSIS — R06.02 SOB (SHORTNESS OF BREATH): ICD-10-CM

## 2022-04-21 DIAGNOSIS — J45.20 MILD INTERMITTENT ASTHMA WITHOUT COMPLICATION: ICD-10-CM

## 2022-04-21 DIAGNOSIS — R06.09 DYSPNEA ON EXERTION: ICD-10-CM

## 2022-04-21 DIAGNOSIS — I49.8 OTHER CARDIAC ARRHYTHMIA: ICD-10-CM

## 2022-04-21 DIAGNOSIS — R10.32 LLQ ABDOMINAL PAIN: ICD-10-CM

## 2022-04-21 DIAGNOSIS — U07.1 INFECTION DUE TO 2019 NOVEL CORONAVIRUS: ICD-10-CM

## 2022-04-21 DIAGNOSIS — G47.00 INSOMNIA, UNSPECIFIED TYPE: ICD-10-CM

## 2022-04-21 DIAGNOSIS — Z00.00 ROUTINE GENERAL MEDICAL EXAMINATION AT A HEALTH CARE FACILITY: Primary | ICD-10-CM

## 2022-04-21 DIAGNOSIS — F33.0 MAJOR DEPRESSIVE DISORDER, RECURRENT EPISODE, MILD (H): ICD-10-CM

## 2022-04-21 DIAGNOSIS — E66.3 OVERWEIGHT: ICD-10-CM

## 2022-04-21 DIAGNOSIS — G62.89 OTHER POLYNEUROPATHY: ICD-10-CM

## 2022-04-21 DIAGNOSIS — K59.09 CHRONIC CONSTIPATION: ICD-10-CM

## 2022-04-21 LAB
ERYTHROCYTE [DISTWIDTH] IN BLOOD BY AUTOMATED COUNT: 11.6 %
HCT VFR BLD AUTO: 44.8 % (ref 35–47)
HEMOGLOBIN: 14.2 G/DL (ref 11.7–15.7)
MCH RBC QN AUTO: 32.6 PG (ref 26–33)
MCHC RBC AUTO-ENTMCNC: 31.7 G/DL (ref 31–36)
MCV RBC AUTO: 102.9 FL (ref 78–100)
PAP SMEAR - HIM PATIENT REPORTED: NEGATIVE
PLATELET COUNT - QUEST: 267 10^9/L (ref 150–375)
RBC # BLD AUTO: 4.35 10*12/L (ref 3.8–5.2)
WBC # BLD AUTO: 6 10*9/L (ref 4–11)

## 2022-04-21 PROCEDURE — 99213 OFFICE O/P EST LOW 20 MIN: CPT | Mod: 25 | Performed by: PHYSICIAN ASSISTANT

## 2022-04-21 PROCEDURE — 99396 PREV VISIT EST AGE 40-64: CPT | Performed by: PHYSICIAN ASSISTANT

## 2022-04-21 PROCEDURE — 85027 COMPLETE CBC AUTOMATED: CPT | Performed by: PHYSICIAN ASSISTANT

## 2022-04-21 PROCEDURE — 84443 ASSAY THYROID STIM HORMONE: CPT | Mod: 90 | Performed by: PHYSICIAN ASSISTANT

## 2022-04-21 PROCEDURE — 36415 COLL VENOUS BLD VENIPUNCTURE: CPT | Performed by: PHYSICIAN ASSISTANT

## 2022-04-21 PROCEDURE — 71046 X-RAY EXAM CHEST 2 VIEWS: CPT | Performed by: PHYSICIAN ASSISTANT

## 2022-04-21 PROCEDURE — 72040 X-RAY EXAM NECK SPINE 2-3 VW: CPT | Performed by: PHYSICIAN ASSISTANT

## 2022-04-21 RX ORDER — BUPROPION HYDROCHLORIDE 300 MG/1
TABLET ORAL
Qty: 90 TABLET | Refills: 3 | Status: SHIPPED | OUTPATIENT
Start: 2022-04-21 | End: 2023-04-28

## 2022-04-21 RX ORDER — FLUTICASONE PROPIONATE 220 UG/1
1 AEROSOL, METERED RESPIRATORY (INHALATION) 2 TIMES DAILY
Qty: 12 G | Refills: 0 | Status: SHIPPED | OUTPATIENT
Start: 2022-04-21 | End: 2022-05-19 | Stop reason: ALTCHOICE

## 2022-04-21 RX ORDER — ALBUTEROL SULFATE 90 UG/1
AEROSOL, METERED RESPIRATORY (INHALATION)
Qty: 18 G | Refills: 1 | Status: SHIPPED | OUTPATIENT
Start: 2022-04-21 | End: 2023-04-28

## 2022-04-21 RX ORDER — TRAZODONE HYDROCHLORIDE 50 MG/1
50 TABLET, FILM COATED ORAL AT BEDTIME
Qty: 90 TABLET | Refills: 3 | Status: SHIPPED | OUTPATIENT
Start: 2022-04-21 | End: 2023-04-28

## 2022-04-21 RX ORDER — VENLAFAXINE HYDROCHLORIDE 150 MG/1
150 TABLET, EXTENDED RELEASE ORAL DAILY
Qty: 90 TABLET | Refills: 0 | Status: SHIPPED | OUTPATIENT
Start: 2022-04-21 | End: 2023-04-28

## 2022-04-21 RX ORDER — VENLAFAXINE HYDROCHLORIDE 75 MG/1
CAPSULE, EXTENDED RELEASE ORAL
Qty: 90 CAPSULE | Refills: 3 | Status: SHIPPED | OUTPATIENT
Start: 2022-04-21 | End: 2022-04-22 | Stop reason: DRUGHIGH

## 2022-04-21 RX ORDER — ALBUTEROL SULFATE 90 UG/1
AEROSOL, METERED RESPIRATORY (INHALATION)
Qty: 18 G | Refills: 1 | Status: CANCELLED | OUTPATIENT
Start: 2022-04-21

## 2022-04-21 ASSESSMENT — ASTHMA QUESTIONNAIRES: ACT_TOTALSCORE: 16

## 2022-04-21 ASSESSMENT — ANXIETY QUESTIONNAIRES
GAD7 TOTAL SCORE: 0
3. WORRYING TOO MUCH ABOUT DIFFERENT THINGS: NOT AT ALL
2. NOT BEING ABLE TO STOP OR CONTROL WORRYING: NOT AT ALL
IF YOU CHECKED OFF ANY PROBLEMS ON THIS QUESTIONNAIRE, HOW DIFFICULT HAVE THESE PROBLEMS MADE IT FOR YOU TO DO YOUR WORK, TAKE CARE OF THINGS AT HOME, OR GET ALONG WITH OTHER PEOPLE: NOT DIFFICULT AT ALL
1. FEELING NERVOUS, ANXIOUS, OR ON EDGE: NOT AT ALL
6. BECOMING EASILY ANNOYED OR IRRITABLE: NOT AT ALL
7. FEELING AFRAID AS IF SOMETHING AWFUL MIGHT HAPPEN: NOT AT ALL
5. BEING SO RESTLESS THAT IT IS HARD TO SIT STILL: NOT AT ALL

## 2022-04-21 ASSESSMENT — PATIENT HEALTH QUESTIONNAIRE - PHQ9
5. POOR APPETITE OR OVEREATING: NOT AT ALL
SUM OF ALL RESPONSES TO PHQ QUESTIONS 1-9: 1

## 2022-04-21 NOTE — PROGRESS NOTES
SUBJECTIVE:   CC: Madeline Schafer is an 46 year old woman who presents for preventive health visit.           Patient has been advised of split billing requirements and indicates understanding: Yes  HPI        SEEING OBGYN - seen last week  Mirena -  No menses    Depression Followup    How are you doing with your depression since your last visit? No change    Are you having other symptoms that might be associated with depression? No    Have you had a significant life event?  No     Are you feeling anxious or having panic attacks?   No    Do you have any concerns with your use of alcohol or other drugs? No       New partner - previous partner - reconnected last summer.    OTHER CONCERNS TODAY    Constipation - Chronic   LLQ pain for  Along time.   Pelvic US completed by OBGYN    Had COVID in Oct/ Nov  Chest cold  Since then albuterol more use - more SOB with exercise.   Palpitations 4 months  Lasting 5-10 min - denies SOB/CP with palpitations.   Non-smoker        Bilateral upper and lower numbness  Upper - to pinky both sides  Lower legs some bilateral neuropathy      Depression stable.   Interested in decreased medications   Trazodone for sleep      Social History     Tobacco Use     Smoking status: Never Smoker     Smokeless tobacco: Never Used   Substance Use Topics     Alcohol use: Yes     Alcohol/week: 3.0 standard drinks     Types: 3 Standard drinks or equivalent per week     Drug use: No     PHQ 8/10/2020 3/5/2021 4/21/2022   PHQ-9 Total Score 8 3 1   Q9: Thoughts of better off dead/self-harm past 2 weeks Not at all Not at all Not at all     LISA-7 SCORE 8/10/2020 3/5/2021 4/21/2022   Total Score - - -   Total Score 0 (minimal anxiety) - -   Total Score 0 0 0       Asthma Follow-Up    Was ACT completed today?    Yes    ACT Total Scores 4/21/2022   ACT TOTAL SCORE -   ASTHMA ER VISITS -   ASTHMA HOSPITALIZATIONS -   ACT TOTAL SCORE (Goal Greater than or Equal to 20) 16   In the past 12 months, how many  times did you visit the emergency room for your asthma without being admitted to the hospital? 0   In the past 12 months, how many times were you hospitalized overnight because of your asthma? 0         Lab work is in process  Labs reviewed in EPIC  BP Readings from Last 3 Encounters:   22 112/72   21 114/72   19 120/78    Wt Readings from Last 3 Encounters:   22 74.7 kg (164 lb 9.6 oz)   21 73.9 kg (163 lb)   19 81.4 kg (179 lb 6.4 oz)                  Patient Active Problem List   Diagnosis     FAMILY HX GI MALIGNANCY     Allergic rhinitis     Health Care Home     Advance care planning     Major depressive disorder, recurrent episode, mild (H)     Mild intermittent asthma without complication     Overweight     Infection due to 2019 novel coronavirus     Past Surgical History:   Procedure Laterality Date     HC EXCISION LESION/TENDON-SHEATH/CAPSULE, FOOT  age 11     HCL PAP THIN LAYER SCREEN  2007    Dr Acevedo       Social History     Tobacco Use     Smoking status: Never Smoker     Smokeless tobacco: Never Used   Substance Use Topics     Alcohol use: Yes     Alcohol/week: 3.0 standard drinks     Types: 3 Standard drinks or equivalent per week     Family History   Problem Relation Age of Onset     Lipids Mother      Dementia Mother      Lipids Father      Prostate Cancer Father         diagnosed age 62     Cancer - colorectal Maternal Grandfather          age 70 colon cancer         Current Outpatient Medications   Medication Sig Dispense Refill     albuterol (VENTOLIN HFA) 108 (90 Base) MCG/ACT inhaler INHALE 2 PUFFS INTO THE LUNGS EVERY 6 HOURS AS NEEDED FOR SHORTNESS OF BREATH/ DYSPNEA 18 g 1     buPROPion (WELLBUTRIN XL) 300 MG 24 hr tablet TAKE 1 TABLET BY MOUTH IN THE MORNING 90 tablet 3     fluticasone (FLOVENT HFA) 220 MCG/ACT inhaler Inhale 1 puff into the lungs 2 times daily 12 g 0     MIRENA 20 MCG/24HR IU IUD None Entered       traZODone (DESYREL) 50 MG tablet  "Take 1 tablet (50 mg) by mouth At Bedtime 90 tablet 3     venlafaxine (EFFEXOR-ER) 150 MG 24 hr tablet Take 1 tablet (150 mg) by mouth daily 90 tablet 0     Allergies   Allergen Reactions     No Known Allergies      Seasonal Allergies      Recent Labs   Lab Test 04/21/22  1234 07/22/19  1701   TSH 1.53 1.69                           Past Medical History:   Diagnosis Date     History of elevated glucose     in pregnancy      Past Surgical History:   Procedure Laterality Date     HC EXCISION LESION/TENDON-SHEATH/CAPSULE, FOOT  age 11     HCL PAP THIN LAYER SCREEN  4/2007    Dr Acevedo       Review of Systems  CONSTITUTIONAL: NEGATIVE for fever, chills, change in weight  INTEGUMENTARU/SKIN: NEGATIVE for worrisome rashes, moles or lesions  EYES: NEGATIVE for vision changes or irritation  ENT: NEGATIVE for ear, mouth and throat problems  RESP: NEGATIVE for significant cough or SOB  BREAST: NEGATIVE for masses, tenderness or discharge  : NEGATIVE for unusual urinary or vaginal symptoms.   PSYCHIATRIC: NEGATIVE for changes in mood or affect     OBJECTIVE:   /72 (BP Location: Right arm, Patient Position: Sitting, Cuff Size: Adult Regular)   Pulse 90   Temp 98.3  F (36.8  C) (Temporal)   Ht 1.746 m (5' 8.75\")   Wt 74.7 kg (164 lb 9.6 oz)   SpO2 96%   BMI 24.48 kg/m    Physical Exam     GENERAL: healthy, alert and no distress  EYES: Eyes grossly normal to inspection, PERRL and conjunctivae and sclerae normal  HENT: ear canals and TM's normal, nose and mouth without ulcers or lesions  NECK: no adenopathy, no asymmetry, masses, or scars and thyroid normal to palpation  RESP: lungs clear to auscultation - no rales, rhonchi or wheezes  CV: regular rate and rhythm, normal S1 S2, no S3 or S4, no murmur, click or rub, no peripheral edema and peripheral pulses strong  ABDOMEN: soft, nontender, no hepatosplenomegaly, no masses and bowel sounds normal  MS: no gross musculoskeletal defects noted, no edema  Full ROM " upper/lower extremities/ full strength on gross examiation  SKIN: no suspicious lesions or rashes  NEURO: Normal strength and tone, mentation intact and speech normal      Mental Status Exam:   Appearance: calm  Grooming: adequately groomed  Demeanor: engaged, cooperative  Affect: normal  Speech: Normal.  Gait:Normal.  Movements: Normal  Form of thought: Logical, Linear and Goal directed  Thought content:  Normal  Insight:Good   Judgment: Good   Cognition: Good       Diagnostic Test Results:  Labs reviewed in Epic    ASSESSMENT/PLAN:   Madeline was seen today for physical and recheck medication.    Diagnoses and all orders for this visit:    Routine general medical examination at a health care facility    Major depressive disorder, recurrent episode, mild (H)  -     buPROPion (WELLBUTRIN XL) 300 MG 24 hr tablet; TAKE 1 TABLET BY MOUTH IN THE MORNING  -     Discontinue: venlafaxine (EFFEXOR-XR) 75 MG 24 hr capsule; TAKE 3 CAPSULES BY MOUTH EVERY DAY  -     venlafaxine (EFFEXOR-ER) 150 MG 24 hr tablet; Take 1 tablet (150 mg) by mouth daily  Dec Venlafaxine to 150 mg daily        Mild intermittent asthma without complication  -     albuterol (VENTOLIN HFA) 108 (90 Base) MCG/ACT inhaler; INHALE 2 PUFFS INTO THE LUNGS EVERY 6 HOURS AS NEEDED FOR SHORTNESS OF BREATH/ DYSPNEA  -     fluticasone (FLOVENT HFA) 220 MCG/ACT inhaler; Inhale 1 puff into the lungs 2 times daily    ADD FLOVENT  I reviewed the patient information handout from Up To Date on this medication including side effects with the patient.  RINSE MOUTH    Overweight    Insomnia, unspecified type  -     traZODone (DESYREL) 50 MG tablet; Take 1 tablet (50 mg) by mouth At Bedtime    Infection due to 2019 novel coronavirus  -     OFFICE/OUTPT VISIT,EST,LEVL III    Other cardiac arrhythmia  -     Adult Cardiac Event Monitor; Future  -     Echocardiogram Complete; Future  -     Hemogram with Platelets ([facility])  -     TSH WITH FREE T4 REFLEX ([facility])  -     OFFICE/OUTPT  "VISIT,EST,LEVL III    SOB (shortness of breath)  -     XR Chest 2 Views  -     Echocardiogram Complete; Future  -     OFFICE/OUTPT VISIT,EST,LEVL III    Dyspnea on exertion  -     XR Chest 2 Views  -     Echocardiogram Complete; Future  -     OFFICE/OUTPT VISIT,EST,LEVL III      21 day event monitor not avail  30 day ordered  Echo ordered      Other polyneuropathy  -     XR Cervical Spine 2/3 Views  -     OFFICE/OUTPT VISIT,EST,LEVL III  Pt to see Dr. Lee for Follow-up on this as we did not have time to address in detail    LLQ abdominal pain  -     OFFICE/OUTPT VISIT,EST,LEVL III  Most likely due to constipation    Chronic constipation  -     OFFICE/OUTPT VISIT,EST,LEVL III  Magnesium, Colace, Docuste, water intake and physical activity    Other orders  -     DEPRESSION ACTION PLAN (DAP)    See me in clinic 4 weeks Follow-up on all above    Patient has been advised of split billing requirements and indicates understanding: Yes    COUNSELING:  Reviewed preventive health counseling, as reflected in patient instructions    Estimated body mass index is 24.48 kg/m  as calculated from the following:    Height as of this encounter: 1.746 m (5' 8.75\").    Weight as of this encounter: 74.7 kg (164 lb 9.6 oz).        She reports that she has never smoked. She has never used smokeless tobacco.      Counseling Resources:  ATP IV Guidelines  Pooled Cohorts Equation Calculator  Breast Cancer Risk Calculator  BRCA-Related Cancer Risk Assessment: FHS-7 Tool  FRAX Risk Assessment  ICSI Preventive Guidelines  Dietary Guidelines for Americans, 2010  USDA's MyPlate  ASA Prophylaxis  Lung CA Screening    Deanna Dumont, PA  [facility]    "

## 2022-04-21 NOTE — NURSING NOTE
Chief Complaint   Patient presents with     Physical     Fasting today      Recheck Medication     Refill medications      Pre-visit Screening:  Immunizations:  up to date  Colonoscopy:  is up to date  Mammogram: is up to date  Asthma Action Test/Plan:  NA  PHQ9:  Done today  GAD7:  Done today  Questioned patient about current smoking habits Pt. has never smoked.  Ok to leave detailed message on voice mail for today's visit only Yes, phone # [phone]

## 2022-04-21 NOTE — LETTER
My Asthma Action Plan    Name: Madeline Castillo   YOB: 1975  Date: 4/21/2022   My doctor: JEFF Givens   My clinic: [facility]        My Control Medicine: Fluticasone propionate (Flovent HFA) - 220 mcg 1 puff 2 x a day  My Rescue Medicine: Albuterol (Proair/Ventolin/Proventil HFA) 2-4 puffs EVERY 4 HOURS as needed. Use a spacer if recommended by your provider.  My Oral Steroid Medicine: none My Asthma Severity:   Moderate Persistent  Know your asthma triggers: exercise or sports and allergies  None            GREEN ZONE   Good Control    I feel good    No cough or wheeze    Can work, sleep and play without asthma symptoms       Take your asthma control medicine every day.     1. If exercise triggers your asthma, take your rescue medication    15 minutes before exercise or sports, and    During exercise if you have asthma symptoms  2. Spacer to use with inhaler: If you have a spacer, make sure to use it with your inhaler             YELLOW ZONE Getting Worse  I have ANY of these:    I do not feel good    Cough or wheeze    Chest feels tight    Wake up at night   1. Keep taking your Green Zone medications  2. Start taking your rescue medicine:    every 20 minutes for up to 1 hour. Then every 4 hours for 24-48 hours.  3. If you stay in the Yellow Zone for more than 12-24 hours, contact your doctor.  4. If you do not return to the Green Zone in 12-24 hours or you get worse, start taking your oral steroid medicine if prescribed by your provider.           RED ZONE Medical Alert - Get Help  I have ANY of these:    I feel awful    Medicine is not helping    Breathing getting harder    Trouble walking or talking    Nose opens wide to breathe       1. Take your rescue medicine NOW  2. If your provider has prescribed an oral steroid medicine, start taking it NOW  3. Call your doctor NOW  4. If you are still in the Red Zone after 20 minutes and you have not reached your  doctor:    Take your rescue medicine again and    Call 911 or go to the emergency room right away    See your regular doctor within 2 weeks of an Emergency Room or Urgent Care visit for follow-up treatment.          Annual Reminders:  Meet with Asthma Educator,  Flu Shot in the Fall, consider Pneumonia Vaccination for patients with asthma (aged 19 and older).    Pharmacy:    [facility] - [place], [place] - [address]  [facility] - [place], [place] - [address]  [id number] - [place], [place] - [address]  [id number] - [place], [place] - [address]    Electronically signed by JEFF Givens   Date: 04/21/22                      Asthma Triggers  How To Control Things That Make Your Asthma Worse    Triggers are things that make your asthma worse.  Look at the list below to help you find your triggers and what you can do about them.  You can help prevent asthma flare-ups by staying away from your triggers.      Trigger                                                          What you can do   Cigarette Smoke  Tobacco smoke can make asthma worse. Do not allow smoking in your home, car or around you.  Be sure no one smokes at a child s day care or school.  If you smoke, ask your health care provider for ways to help you quit.  Ask family members to quit too.  Ask your health care provider for a referral to Quit Plan to help you quit smoking, or call 2-733-283-PLAN.     Colds, Flu, Bronchitis  These are common triggers of asthma. Wash your hands often.  Don t touch your eyes, nose or mouth.  Get a flu shot every year.     Dust Mites  These are tiny bugs that live in cloth or carpet. They are too small to see. Wash sheets and blankets in hot water every week.   Encase pillows and mattress in dust mite proof covers.  Avoid having carpet if you can. If you have carpet, vacuum weekly.   Use a dust  mask and HEPA vacuum.   Pollen and Outdoor Mold  Some people are allergic to trees, grass, or weed pollen, or molds. Try to keep your windows closed.  Limit time out doors when pollen count is high.   Ask you health care provider about taking medicine during allergy season.     Animal Dander  Some people are allergic to skin flakes, urine or saliva from pets with fur or feathers. Keep pets with fur or feathers out of your home.    If you can t keep the pet outdoors, then keep the pet out of your bedroom.  Keep the bedroom door closed.  Keep pets off cloth furniture and away from stuffed toys.     Mice, Rats, and Cockroaches   Some people are allergic to the waste from these pests.   Cover food and garbage.  Clean up spills and food crumbs.  Store grease in the refrigerator.   Keep food out of the bedroom.   Indoor Mold  This can be a trigger if your home has high moisture. Fix leaking faucets, pipes, or other sources of water.   Clean moldy surfaces.  Dehumidify basement if it is damp and smelly.   Smoke, Strong Odors, and Sprays  These can reduce air quality. Stay away from strong odors and sprays, such as perfume, powder, hair spray, paints, smoke incense, paint, cleaning products, candles and new carpet.   Exercise or Sports  Some people with asthma have this trigger. Be active!  Ask your doctor about taking medicine before sports or exercise to prevent symptoms.    Warm up for 5-10 minutes before and after sports or exercise.     Other Triggers of Asthma  Cold air:  Cover your nose and mouth with a scarf.  Sometimes laughing or crying can be a trigger.  Some medicines and food can trigger asthma.

## 2022-04-22 ENCOUNTER — TELEPHONE (OUTPATIENT)
Dept: FAMILY MEDICINE | Facility: CLINIC | Age: 47
End: 2022-04-22

## 2022-04-22 DIAGNOSIS — J45.20 MILD INTERMITTENT ASTHMA WITHOUT COMPLICATION: Primary | ICD-10-CM

## 2022-04-22 LAB — TSH SERPL-ACNC: 1.53 MIU/L

## 2022-04-22 ASSESSMENT — ANXIETY QUESTIONNAIRES: GAD7 TOTAL SCORE: 0

## 2022-04-22 NOTE — TELEPHONE ENCOUNTER
Patient called into the CS line asking if there is another inhaler other then Flovent inhaler as this one is not covered and is over 400 dollars even with a good rx coupon. She also wanted to inform Deanna that she is scheduled for her Echo on 5/13/2022 and is wondering if this is an ok date. She also called about the 21 day heart monitor and they are currently out of stock. Routing to Deanna for review.      Patient can be reached at [phone]

## 2022-04-22 NOTE — TELEPHONE ENCOUNTER
Please have her call her insurance to see what is a covered inhaled daily steroid and I will prescribe.     OK date for echo  I will change to 28 day event monitor    Deanna Dumont PA-C  4/22/2022

## 2022-04-22 NOTE — TELEPHONE ENCOUNTER
Called patient per Deanna and informed her that her chest xray was fully reviewed with Dr. Lee and there was nothing seen to worry about on there and that it was normal overall. As for her hemogram results, normal as well. I told her that a message would be sent to our pharmacist Dian about possible inhalers similar to Flovent that may have  coupons to use or better deals to make it more affordable since she does not have prescription coverage. Routing to Dian for review, would you be able to help the patient with this?    Patient can be reached at [phone]

## 2022-04-22 NOTE — TELEPHONE ENCOUNTER
Spoke to the patient and she does not have prescription for coverage. She is wondering what other alternatives would be appropriate but is asking for Deanna to give her a call as she had a few questions on her results. She can be reached at [phone]

## 2022-04-26 RX ORDER — FLUTICASONE PROPIONATE AND SALMETEROL 113; 14 UG/1; UG/1
1 POWDER, METERED RESPIRATORY (INHALATION) 2 TIMES DAILY
Qty: 1 EACH | Refills: 3 | Status: SHIPPED | OUTPATIENT
Start: 2022-04-26 | End: 2023-04-28

## 2022-04-26 NOTE — TELEPHONE ENCOUNTER
Please call pt  Pharmacist recommended AirDuo - has long acting bronchodilator and steroid.         This is the one that through the [employer] savings program is about $40 a month      Tell her I sent it to [facility] in [employer] [place]    Deanna Dumont PA-C  4/26/2022

## 2022-05-03 ENCOUNTER — TELEPHONE (OUTPATIENT)
Dept: FAMILY MEDICINE | Facility: CLINIC | Age: 47
End: 2022-05-03

## 2022-05-03 NOTE — TELEPHONE ENCOUNTER
Pt was here for a physical on 4/21.   She would like her lipid checked, can you put order in future so she can come back for a lab only?

## 2022-05-04 ENCOUNTER — OFFICE VISIT (OUTPATIENT)
Dept: FAMILY MEDICINE | Facility: CLINIC | Age: 47
End: 2022-05-04

## 2022-05-04 VITALS
OXYGEN SATURATION: 99 % | TEMPERATURE: 97.8 F | SYSTOLIC BLOOD PRESSURE: 110 MMHG | RESPIRATION RATE: 20 BRPM | WEIGHT: 167 LBS | HEART RATE: 91 BPM | DIASTOLIC BLOOD PRESSURE: 78 MMHG | BODY MASS INDEX: 24.84 KG/M2

## 2022-05-04 DIAGNOSIS — R20.2 PARESTHESIA AND PAIN OF BOTH UPPER EXTREMITIES: Primary | ICD-10-CM

## 2022-05-04 DIAGNOSIS — M79.601 PARESTHESIA AND PAIN OF BOTH UPPER EXTREMITIES: Primary | ICD-10-CM

## 2022-05-04 DIAGNOSIS — Z13.220 LIPID SCREENING: ICD-10-CM

## 2022-05-04 DIAGNOSIS — M79.602 PARESTHESIA AND PAIN OF BOTH UPPER EXTREMITIES: Primary | ICD-10-CM

## 2022-05-04 LAB
CHOLEST SERPL-MCNC: 236 MG/DL (ref 0–199)
CHOLEST/HDLC SERPL: 3 {RATIO} (ref 0–5)
HDLC SERPL-MCNC: 74 MG/DL (ref 40–150)
LDLC SERPL CALC-MCNC: 139 MG/DL (ref 0–130)
TRIGL SERPL-MCNC: 115 MG/DL (ref 0–149)

## 2022-05-04 PROCEDURE — 99215 OFFICE O/P EST HI 40 MIN: CPT | Performed by: STUDENT IN AN ORGANIZED HEALTH CARE EDUCATION/TRAINING PROGRAM

## 2022-05-04 PROCEDURE — 80061 LIPID PANEL: CPT | Performed by: STUDENT IN AN ORGANIZED HEALTH CARE EDUCATION/TRAINING PROGRAM

## 2022-05-04 PROCEDURE — 36415 COLL VENOUS BLD VENIPUNCTURE: CPT | Performed by: STUDENT IN AN ORGANIZED HEALTH CARE EDUCATION/TRAINING PROGRAM

## 2022-05-04 NOTE — PATIENT INSTRUCTIONS
Blood work here today    Schedule MRI at   [facility]  [address]  [address]  [facility] [address]  [phone] -  Main Scheduling    Follow-up with me to review MRI results and make plan for next steps    We can get xrays of your wrist or low back at any time

## 2022-05-04 NOTE — NURSING NOTE
Chief Complaint   Patient presents with     Follow Up     Follow up on neck and arm numbness, first saw Deanna for this and xrays were done      Pre-visit Screening:  Immunizations:  up to date  Colonoscopy:  NA  Mammogram: is up to date  Asthma Action Test/Plan:  NA  PHQ9:  NA  GAD7:  NA  Questioned patient about current smoking habits Pt. has never smoked.  Ok to leave detailed message on voice mail for today's visit only Yes, phone # [phone]

## 2022-05-04 NOTE — PROGRESS NOTES
ASSESSMENT & PLAN      ICD-10-CM    1. Paresthesia and pain of both upper extremities  R20.2 Radiology Referral    M79.601 MRI CERVICAL SPINE W/O CONTRAST    M79.602 CANCELED: MRI CERVICAL SPINE W/O CONTRAST   2. Lipid screening  Z13.220 Lipid Panel ([facility])      Previous cspine XR reviewed with pt, degenerative changes as below. While hx and exam most c/w neurogenic thoracic outlet vs bilateral cubital tunnel, we discussed importance of evaluating cervical spine with MRI to r/o central etiology at length. We also discussed symptomatic mgmt in interim, follow-up after MRI.    Fasting for lipid check.     Patient Instructions   Blood work here today    Schedule MRI at   [facility]  [address]  [address]  [facility] [address]  [phone] -  Main Scheduling    Follow-up with me to review MRI results and make plan for next steps    We can get xrays of your wrist or low back at any time      45 minutes spent on the date of the encounter doing chart review, history and exam, documentation and further activities per the note.    Chino Lee MD, [facility]      -----    SUBJECTIVE  Madeline Castillo is a/an 46 year old female who is seen for evaluation of     Chief Complaint   Patient presents with     Follow Up     Follow up on neck and arm numbness, first saw Deanna for this and xrays were done      The patient is seen by themselves.  The patient is Right handed    Date of Onset: chronic neck pain since working as a  since early 2000s, worked with chiropractor, accupuncture, and PT at that time. 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 Began noticing bilateral arm paresthesias ~2 years ago when started to do more yoga. Has been working with PT more recently and does get temporary relief with manual treatments but no dedicated HEP. No acute injury at either time.     Location of Pain: L>R ulnar side of arms from shoulder to pinky  fingers  Worsened by: elevating arms above shoulder height  Better with: ergonomic adjustments  Associated symptoms: no focal weakness; denies swelling or warmth    Patient's PMH, PSH, and family hx reviewed.      OBJECTIVE:  /78 (BP Location: Right arm, Patient Position: Sitting, Cuff Size: Adult Large)   Pulse 91   Temp 97.8  F (36.6  C) (Temporal)   Resp 20   Wt 75.8 kg (167 lb)   SpO2 99%   BMI 24.84 kg/m     Alert, NAD  NC/AT  Sclerae anicteric  Regular  Resp nonlabored  Skin warm and dry  No focal neuro deficits. Speech intact. Normal gait.  Appropriate affect    C spine AROM wnl, no focal midline ttp or stepoff  Neg spurling bilaterally  5/5 str BUE, SILT BUE  symm 2+ bicep and tricep reflexes  Distal pulses intact      RADIOLOGY:  Exam: CERVICAL SPINE 2 VIEWS Exam Date: 04/21/2022   EXAM: CERVICAL SPINE 2 VIEWS   LOCATION: [facility]   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   COMPARISON: None.   TECHNIQUE: CR Cervical Spine.   IMPRESSION: Degenerative cervical spondylosis. Satisfactory height. 1 mm anterior subluxation C2-C3 C3-C4.   Interspace narrowing/osteophytes C4-C7. Normal cervical prevertebral soft tissues. Minimal scarring in the lung apices.   Calcification of the carotid bifurcations. Airways Airways patent. Cervical spine is otherwise unremarkable.   Performed by: JACOB   Transcribed By: COLE on: 04/22/2022 7:33 AM CDT   Finalized By: KATHARINA FELIZ M.D. on: 04/22/2022 7:33 AM CDT

## 2022-05-13 ENCOUNTER — HOSPITAL ENCOUNTER (OUTPATIENT)
Dept: CARDIOLOGY | Facility: CLINIC | Age: 47
Discharge: HOME | End: 2022-05-13
Attending: PHYSICIAN ASSISTANT
Payer: COMMERCIAL

## 2022-05-13 DIAGNOSIS — R06.02 SOB (SHORTNESS OF BREATH): ICD-10-CM

## 2022-05-13 DIAGNOSIS — U07.1 INFECTION DUE TO 2019 NOVEL CORONAVIRUS: ICD-10-CM

## 2022-05-13 DIAGNOSIS — R06.09 DYSPNEA ON EXERTION: ICD-10-CM

## 2022-05-13 DIAGNOSIS — I49.8 OTHER CARDIAC ARRHYTHMIA: ICD-10-CM

## 2022-05-13 LAB — LVEF ECHO: NORMAL

## 2022-05-13 PROCEDURE — 93272 ECG/REVIEW INTERPRET ONLY: CPT | Performed by: INTERNAL MEDICINE

## 2022-05-13 PROCEDURE — 93306 TTE W/DOPPLER COMPLETE: CPT

## 2022-05-13 PROCEDURE — 93306 TTE W/DOPPLER COMPLETE: CPT | Mod: 26 | Performed by: INTERNAL MEDICINE

## 2022-05-13 PROCEDURE — 93270 REMOTE 30 DAY ECG REV/REPORT: CPT

## 2022-05-17 ENCOUNTER — MYC MEDICAL ADVICE (OUTPATIENT)
Dept: FAMILY MEDICINE | Facility: CLINIC | Age: 47
End: 2022-05-17

## 2022-05-17 DIAGNOSIS — R93.89 CHEST X-RAY ABNORMALITY: Primary | ICD-10-CM

## 2022-05-17 DIAGNOSIS — U07.1 INFECTION DUE TO 2019 NOVEL CORONAVIRUS: ICD-10-CM

## 2022-05-17 DIAGNOSIS — R06.02 SOB (SHORTNESS OF BREATH): ICD-10-CM

## 2022-05-17 DIAGNOSIS — R06.09 DYSPNEA ON EXERTION: ICD-10-CM

## 2022-06-16 ENCOUNTER — TELEPHONE (OUTPATIENT)
Dept: FAMILY MEDICINE | Facility: CLINIC | Age: 47
End: 2022-06-16

## 2022-06-16 DIAGNOSIS — R93.89 ABNORMAL CHEST X-RAY: Primary | ICD-10-CM

## 2022-06-16 DIAGNOSIS — R06.09 DYSPNEA ON EXERTION: ICD-10-CM

## 2022-06-16 NOTE — TELEPHONE ENCOUNTER
Went over the Holter    Breathing ok off Steroid    Constipation continues - fiber intake is low   Pt had colonoscopy - will get records -she says was in 2019  Can do Dulcolax - declines GI referral at this time    Doesn't want to see me but I did advise Follow-up CXR to ensure resolved atelectasis  Ordered At [place]    Deanna Dumont PA-C  6/16/2022

## 2022-06-21 NOTE — TELEPHONE ENCOUNTER
I called [facility]. I was told that they do not have a record for this patient.   I left a message for patient to call me back -- where was her colonoscopy?

## 2022-06-23 NOTE — TELEPHONE ENCOUNTER
Can you please call pt and let her know [facility] has no record of her colonoscopy and have her find out where it was done and faxed to us please?    Thanks,  Deanna Dumont PA-C

## 2022-06-28 ENCOUNTER — HOSPITAL ENCOUNTER (OUTPATIENT)
Dept: GENERAL RADIOLOGY | Facility: CLINIC | Age: 47
Discharge: HOME | End: 2022-06-28
Attending: PHYSICIAN ASSISTANT | Admitting: PHYSICIAN ASSISTANT
Payer: COMMERCIAL

## 2022-06-28 DIAGNOSIS — R93.89 ABNORMAL CHEST X-RAY: ICD-10-CM

## 2022-06-28 DIAGNOSIS — R06.09 DYSPNEA ON EXERTION: ICD-10-CM

## 2022-06-28 PROCEDURE — 71046 X-RAY EXAM CHEST 2 VIEWS: CPT

## 2022-08-17 DIAGNOSIS — F33.0 MAJOR DEPRESSIVE DISORDER, RECURRENT EPISODE, MILD (H): ICD-10-CM

## 2022-08-18 RX ORDER — VENLAFAXINE HYDROCHLORIDE 150 MG/1
CAPSULE, EXTENDED RELEASE ORAL
Qty: 90 CAPSULE | Refills: 0 | COMMUNITY
Start: 2022-08-18

## 2022-08-18 NOTE — TELEPHONE ENCOUNTER
Madeline Castillo is requesting a refill of:    Refused Prescriptions:                       Disp   Refills    venlafaxine (EFFEXOR XR) 150 MG 24 hr caps*90 cap*0        Sig: TAKE ONE CAPSULE BY MOUTH ONE TIME DAILY  Refused By: JAHAIRA ONEILL  Reason for Refusal: Patient needs appointment    Pt last seen 04/21/22 advised to recheck depression in 4 weeks, pt now due for OV. Sent [employer] message

## 2022-11-21 ENCOUNTER — HEALTH MAINTENANCE LETTER (OUTPATIENT)
Age: 47
End: 2022-11-21

## 2022-11-30 ENCOUNTER — OFFICE VISIT (OUTPATIENT)
Dept: FAMILY MEDICINE | Facility: CLINIC | Age: 47
End: 2022-11-30

## 2022-11-30 VITALS
DIASTOLIC BLOOD PRESSURE: 70 MMHG | SYSTOLIC BLOOD PRESSURE: 108 MMHG | TEMPERATURE: 97.5 F | OXYGEN SATURATION: 99 % | WEIGHT: 177.8 LBS | HEART RATE: 92 BPM | HEIGHT: 69 IN | RESPIRATION RATE: 18 BRPM | BODY MASS INDEX: 26.33 KG/M2

## 2022-11-30 DIAGNOSIS — J06.9 ACUTE UPPER RESPIRATORY INFECTION: Primary | ICD-10-CM

## 2022-11-30 DIAGNOSIS — Z12.11 SPECIAL SCREENING FOR MALIGNANT NEOPLASMS, COLON: ICD-10-CM

## 2022-11-30 PROCEDURE — 99213 OFFICE O/P EST LOW 20 MIN: CPT | Performed by: PHYSICIAN ASSISTANT

## 2022-11-30 ASSESSMENT — ANXIETY QUESTIONNAIRES
5. BEING SO RESTLESS THAT IT IS HARD TO SIT STILL: NOT AT ALL
7. FEELING AFRAID AS IF SOMETHING AWFUL MIGHT HAPPEN: NOT AT ALL
6. BECOMING EASILY ANNOYED OR IRRITABLE: NOT AT ALL
1. FEELING NERVOUS, ANXIOUS, OR ON EDGE: NOT AT ALL
2. NOT BEING ABLE TO STOP OR CONTROL WORRYING: NOT AT ALL
GAD7 TOTAL SCORE: 0
3. WORRYING TOO MUCH ABOUT DIFFERENT THINGS: NOT AT ALL
GAD7 TOTAL SCORE: 0

## 2022-11-30 ASSESSMENT — PATIENT HEALTH QUESTIONNAIRE - PHQ9
5. POOR APPETITE OR OVEREATING: NOT AT ALL
SUM OF ALL RESPONSES TO PHQ QUESTIONS 1-9: 0

## 2022-11-30 ASSESSMENT — ASTHMA QUESTIONNAIRES: ACT_TOTALSCORE: 23

## 2022-11-30 NOTE — PROGRESS NOTES
Assessment & Plan     Special screening for malignant neoplasms, colon  Due today  - Colonoscopy Screening  Referral    Acute upper respiratory infection-patient declines swabs today, reassured by largely normal PE, will treat symptomatically and await improvement  -Rest, increase fluids, honey for cough suppression/sore throat  -Add Mucinex and Sudafed D for symptomatic relief  -Delsym for cough  -Ibuprofen/Tylenol as needed for symptomatic relief  Seek emergency care for significant shortness of breath, chest pain, and/or fever >103F that cannot be controlled with antipyretics          Follow up if symptoms persist or worsen    No follow-ups on file.    [address], LING  [facility]    Subjective   Madeline is a 47 year old, presenting for the following health issues:  Cough (Pt state she has been sick since Saturday - pt had some lungs problems in the past - she also has asthma - no fever ) and Chest Pain (Pt has pain on her chest when cough - Pt hasn't done COVID-19 at home she doesn't think she had COVID-19 )      HPI     Patient declines swabs today.  Was exposed to an illness by boyfriend.    Acute Illness  Acute illness concerns: Cough, rhinorrhea  Onset/Duration: 4 days  Symptoms:  Fever: No  Chills/Sweats: No  Headache (location?): YES  Sinus Pressure: YES  Conjunctivitis:  No  Ear Pain: no  Rhinorrhea: YES  Congestion: YES  Sore Throat: No  Cough: YES-non-productive  Wheeze: YES  Decreased Appetite: YES  Nausea: No  Vomiting: No  Diarrhea: No  Dysuria/Freq.: No  Dysuria or Hematuria: No  Fatigue/Achiness: YES  Sick/Strep Exposure: YES  Therapies tried and outcome: Asthma meds, Advil, Mucinex      Review of Systems   Constitutional, HEENT, cardiovascular, pulmonary, gi and gu systems are negative, except as otherwise noted.      Objective    /70 (BP Location: Right arm, Patient Position: Sitting, Cuff Size: Adult Large)   Pulse 92   Temp 97.5  F (36.4  C) (Tympanic)    "Resp 18   Ht 1.746 m (5' 8.75\")   Wt 80.6 kg (177 lb 12.8 oz)   SpO2 99%   BMI 26.45 kg/m    Body mass index is 26.45 kg/m .  Physical Exam   GENERAL: healthy, alert and no distress  HENT: ear canals and TM's normal, nose and mouth without ulcers or lesions. Pain on palpations of maxillary sinuses  NECK: no adenopathy, no asymmetry, masses, or scars and thyroid normal to palpation  RESP: lungs clear to auscultation - no rales, rhonchi or wheezes  CV: regular rate and rhythm, normal S1 S2, no S3 or S4, no murmur, click or rub, no peripheral edema and peripheral pulses strong  ABDOMEN: soft, nontender, no hepatosplenomegaly, no masses and bowel sounds normal  MS: no gross musculoskeletal defects noted, no edema  NEURO: Normal strength and tone, mentation intact and speech normal                      "

## 2022-11-30 NOTE — NURSING NOTE
Chief Complaint   Patient presents with     Cough     Pt state she has been sick since Saturday - pt had some lungs problems in the past - she also has asthma - no fever      Chest Pain     Pt has pain on her chest when cough - Pt hasn't done COVID-19 at home she doesn't think she had COVID-19    Pre-visit Screening:  Immunizations:  not up to date -   Colonoscopy:  is due and ordered today  Mammogram: is up to date  Asthma Action Test/Plan:  Done today   PHQ9:  Done today   GAD7:  Done today   Questioned patient about current smoking habits Pt. has never smoked.  Ok to leave detailed message on voice mail for today's visit only Yes , phone #   [phone]

## 2023-01-01 NOTE — TELEPHONE ENCOUNTER
Faxed 90 days per CER refills from 5/2018   For information on Fall & Injury Prevention, visit: [email or web address] OR  [email or web address] OR  https://www.cdc.gov/steadi/patient.html

## 2023-01-11 ENCOUNTER — TRANSFERRED RECORDS (OUTPATIENT)
Dept: FAMILY MEDICINE | Facility: CLINIC | Age: 48
End: 2023-01-11

## 2023-04-25 ENCOUNTER — LAB REQUISITION (OUTPATIENT)
Dept: LAB | Facility: CLINIC | Age: 48
End: 2023-04-25

## 2023-04-25 ENCOUNTER — LAB REQUISITION (OUTPATIENT)
Dept: LAB | Facility: CLINIC | Age: 48
End: 2023-04-25
Payer: COMMERCIAL

## 2023-04-25 DIAGNOSIS — L65.9 NONSCARRING HAIR LOSS, UNSPECIFIED: ICD-10-CM

## 2023-04-25 DIAGNOSIS — K59.09 OTHER CONSTIPATION: ICD-10-CM

## 2023-04-25 LAB
ALBUMIN SERPL BCG-MCNC: 4.5 G/DL (ref 3.5–5.2)
ALP SERPL-CCNC: 70 U/L (ref 35–104)
ALT SERPL W P-5'-P-CCNC: 21 U/L (ref 10–35)
ANION GAP SERPL CALCULATED.3IONS-SCNC: 13 MMOL/L (ref 7–15)
AST SERPL W P-5'-P-CCNC: 25 U/L (ref 10–35)
BILIRUB SERPL-MCNC: 0.2 MG/DL
BUN SERPL-MCNC: 11.8 MG/DL (ref 6–20)
CALCIUM SERPL-MCNC: 9.7 MG/DL (ref 8.6–10)
CHLORIDE SERPL-SCNC: 102 MMOL/L (ref 98–107)
CREAT SERPL-MCNC: 0.9 MG/DL (ref 0.51–0.95)
DEPRECATED HCO3 PLAS-SCNC: 24 MMOL/L (ref 22–29)
ERYTHROCYTE [DISTWIDTH] IN BLOOD BY AUTOMATED COUNT: 11.8 % (ref 10–15)
FERRITIN SERPL-MCNC: 91 NG/ML (ref 6–175)
GFR SERPL CREATININE-BSD FRML MDRD: 78 ML/MIN/1.73M2
GLUCOSE SERPL-MCNC: 99 MG/DL (ref 70–99)
HCT VFR BLD AUTO: 41.2 % (ref 35–47)
HGB BLD-MCNC: 13.5 G/DL (ref 11.7–15.7)
HOLD SPECIMEN: NORMAL
IRON BINDING CAPACITY (ROCHE): 289 UG/DL (ref 240–430)
IRON SATN MFR SERPL: 34 % (ref 15–46)
IRON SERPL-MCNC: 98 UG/DL (ref 37–145)
MCH RBC QN AUTO: 32.5 PG (ref 26.5–33)
MCHC RBC AUTO-ENTMCNC: 32.8 G/DL (ref 31.5–36.5)
MCV RBC AUTO: 99 FL (ref 78–100)
PLATELET # BLD AUTO: 259 10E3/UL (ref 150–450)
POTASSIUM SERPL-SCNC: 4.1 MMOL/L (ref 3.4–5.3)
PROT SERPL-MCNC: 7.2 G/DL (ref 6.4–8.3)
RBC # BLD AUTO: 4.15 10E6/UL (ref 3.8–5.2)
SODIUM SERPL-SCNC: 139 MMOL/L (ref 136–145)
TRANSFERRIN SERPL-MCNC: 245 MG/DL (ref 200–360)
TSH SERPL DL<=0.005 MIU/L-ACNC: 1.81 UIU/ML (ref 0.3–4.2)
WBC # BLD AUTO: 6.2 10E3/UL (ref 4–11)

## 2023-04-25 PROCEDURE — 84466 ASSAY OF TRANSFERRIN: CPT | Performed by: OBSTETRICS & GYNECOLOGY

## 2023-04-25 PROCEDURE — 83550 IRON BINDING TEST: CPT | Performed by: OBSTETRICS & GYNECOLOGY

## 2023-04-25 PROCEDURE — 80053 COMPREHEN METABOLIC PANEL: CPT | Mod: ORL | Performed by: OBSTETRICS & GYNECOLOGY

## 2023-04-25 PROCEDURE — 85027 COMPLETE CBC AUTOMATED: CPT | Performed by: OBSTETRICS & GYNECOLOGY

## 2023-04-25 PROCEDURE — 82728 ASSAY OF FERRITIN: CPT | Mod: ORL | Performed by: OBSTETRICS & GYNECOLOGY

## 2023-04-25 PROCEDURE — 84443 ASSAY THYROID STIM HORMONE: CPT | Mod: ORL | Performed by: OBSTETRICS & GYNECOLOGY

## 2023-04-28 ENCOUNTER — OFFICE VISIT (OUTPATIENT)
Dept: FAMILY MEDICINE | Facility: CLINIC | Age: 48
End: 2023-04-28

## 2023-04-28 VITALS
OXYGEN SATURATION: 98 % | SYSTOLIC BLOOD PRESSURE: 106 MMHG | TEMPERATURE: 98.6 F | DIASTOLIC BLOOD PRESSURE: 72 MMHG | BODY MASS INDEX: 26.33 KG/M2 | WEIGHT: 177.8 LBS | HEART RATE: 85 BPM | HEIGHT: 69 IN

## 2023-04-28 DIAGNOSIS — Z80.3 FAMILY HISTORY OF MALIGNANT NEOPLASM OF BREAST: ICD-10-CM

## 2023-04-28 DIAGNOSIS — G47.00 INSOMNIA, UNSPECIFIED TYPE: ICD-10-CM

## 2023-04-28 DIAGNOSIS — Z11.59 NEED FOR HEPATITIS C SCREENING TEST: ICD-10-CM

## 2023-04-28 DIAGNOSIS — K59.09 OTHER CONSTIPATION: ICD-10-CM

## 2023-04-28 DIAGNOSIS — J45.20 MILD INTERMITTENT ASTHMA WITHOUT COMPLICATION: ICD-10-CM

## 2023-04-28 DIAGNOSIS — F33.0 MAJOR DEPRESSIVE DISORDER, RECURRENT EPISODE, MILD (H): ICD-10-CM

## 2023-04-28 DIAGNOSIS — Z00.00 ENCOUNTER FOR ROUTINE HISTORY AND PHYSICAL EXAM IN FEMALE PATIENT: Primary | ICD-10-CM

## 2023-04-28 LAB
BUN SERPL-MCNC: 14 MG/DL (ref 7–25)
BUN/CREATININE RATIO: 13.9 (ref 6–32)
CALCIUM SERPL-MCNC: 9.4 MG/DL (ref 8.6–10.3)
CHLORIDE SERPLBLD-SCNC: 104.3 MMOL/L (ref 98–110)
CHOLEST SERPL-MCNC: 245 MG/DL (ref 0–199)
CHOLEST/HDLC SERPL: 4 {RATIO} (ref 0–5)
CO2 SERPL-SCNC: 30.9 MMOL/L (ref 20–32)
CREAT SERPL-MCNC: 1.01 MG/DL (ref 0.6–1.3)
GLUCOSE SERPL-MCNC: 95 MG/DL (ref 60–99)
HDLC SERPL-MCNC: 61 MG/DL (ref 40–150)
LDLC SERPL CALC-MCNC: 156 MG/DL (ref 0–130)
POTASSIUM SERPL-SCNC: 4.67 MMOL/L (ref 3.5–5.3)
SODIUM SERPL-SCNC: 140.8 MMOL/L (ref 135–146)
TRIGL SERPL-MCNC: 141 MG/DL (ref 0–149)

## 2023-04-28 PROCEDURE — 99396 PREV VISIT EST AGE 40-64: CPT | Performed by: FAMILY MEDICINE

## 2023-04-28 PROCEDURE — 80061 LIPID PANEL: CPT | Performed by: FAMILY MEDICINE

## 2023-04-28 PROCEDURE — 36415 COLL VENOUS BLD VENIPUNCTURE: CPT | Performed by: FAMILY MEDICINE

## 2023-04-28 PROCEDURE — 80048 BASIC METABOLIC PNL TOTAL CA: CPT | Performed by: FAMILY MEDICINE

## 2023-04-28 PROCEDURE — 86803 HEPATITIS C AB TEST: CPT | Mod: 90 | Performed by: FAMILY MEDICINE

## 2023-04-28 RX ORDER — GABAPENTIN 100 MG/1
100 CAPSULE ORAL 3 TIMES DAILY
COMMUNITY

## 2023-04-28 RX ORDER — VENLAFAXINE HYDROCHLORIDE 150 MG/1
150 TABLET, EXTENDED RELEASE ORAL DAILY
Qty: 90 TABLET | Refills: 3 | Status: SHIPPED | OUTPATIENT
Start: 2023-04-28 | End: 2023-05-25

## 2023-04-28 RX ORDER — BUPROPION HYDROCHLORIDE 300 MG/1
TABLET ORAL
Qty: 90 TABLET | Refills: 3 | Status: SHIPPED | OUTPATIENT
Start: 2023-04-28 | End: 2024-05-23

## 2023-04-28 RX ORDER — ESTRADIOL 0.5 MG/1
0.5 TABLET ORAL DAILY
COMMUNITY

## 2023-04-28 RX ORDER — TRAZODONE HYDROCHLORIDE 50 MG/1
50 TABLET, FILM COATED ORAL AT BEDTIME
Qty: 90 TABLET | Refills: 3 | Status: SHIPPED | OUTPATIENT
Start: 2023-04-28 | End: 2024-05-23

## 2023-04-28 RX ORDER — ALBUTEROL SULFATE 90 UG/1
AEROSOL, METERED RESPIRATORY (INHALATION)
Qty: 18 G | Refills: 1 | Status: SHIPPED | OUTPATIENT
Start: 2023-04-28 | End: 2024-05-23

## 2023-04-28 ASSESSMENT — ANXIETY QUESTIONNAIRES
7. FEELING AFRAID AS IF SOMETHING AWFUL MIGHT HAPPEN: SEVERAL DAYS
2. NOT BEING ABLE TO STOP OR CONTROL WORRYING: MORE THAN HALF THE DAYS
GAD7 TOTAL SCORE: 8
5. BEING SO RESTLESS THAT IT IS HARD TO SIT STILL: NOT AT ALL
IF YOU CHECKED OFF ANY PROBLEMS ON THIS QUESTIONNAIRE, HOW DIFFICULT HAVE THESE PROBLEMS MADE IT FOR YOU TO DO YOUR WORK, TAKE CARE OF THINGS AT HOME, OR GET ALONG WITH OTHER PEOPLE: VERY DIFFICULT
3. WORRYING TOO MUCH ABOUT DIFFERENT THINGS: MORE THAN HALF THE DAYS
6. BECOMING EASILY ANNOYED OR IRRITABLE: SEVERAL DAYS
GAD7 TOTAL SCORE: 8
1. FEELING NERVOUS, ANXIOUS, OR ON EDGE: MORE THAN HALF THE DAYS

## 2023-04-28 ASSESSMENT — PATIENT HEALTH QUESTIONNAIRE - PHQ9
SUM OF ALL RESPONSES TO PHQ QUESTIONS 1-9: 8
5. POOR APPETITE OR OVEREATING: NOT AT ALL

## 2023-04-28 NOTE — NURSING NOTE
Chief Complaint   Patient presents with     Physical     Fasting today, pap done at [facility]     Recheck Medication     Refill medications     Constipation     Wondering about an colonoscopy, has been dealing with ongoing constiptation     Pre-visit Screening:  Immunizations:  up to date  Colonoscopy:  is up to date- will get records  Mammogram: is up to date- will get records  Asthma Action Test/Plan:  NA  PHQ9: Done today  GAD7: Done today  Questioned patient about current smoking habits Pt. has never smoked.  Ok to leave detailed message on voice mail for today's visit only Yes, phone # [phone]

## 2023-04-28 NOTE — LETTER
My Asthma Action Plan    Name: Madeline Castillo   YOB: 1975  Date: 4/28/2023   My doctor: Radha Olsen MD   My clinic: [facility]        My Rescue Medicine:   Albuterol inhaler (Proair/Ventolin/Proventil HFA)  2-4 puffs EVERY 4 HOURS as needed. Use a spacer if recommended by your provider.   My Asthma Severity:   Intermittent / Exercise Induced  Know your asthma triggers: exercise or sports  None          GREEN ZONE   Good Control  I feel good  No cough or wheeze  Can work, sleep and play without asthma symptoms       Take your asthma control medicine every day.     If exercise triggers your asthma, take your rescue medication  15 minutes before exercise or sports, and  During exercise if you have asthma symptoms  Spacer to use with inhaler: If you have a spacer, make sure to use it with your inhaler             YELLOW ZONE Getting Worse  I have ANY of these:  I do not feel good  Cough or wheeze  Chest feels tight  Wake up at night   Keep taking your Green Zone medications  Start taking your rescue medicine:  every 20 minutes for up to 1 hour. Then every 4 hours for 24-48 hours.  If you stay in the Yellow Zone for more than 12-24 hours, contact your doctor.  If you do not return to the Green Zone in 12-24 hours or you get worse, start taking your oral steroid medicine if prescribed by your provider.           RED ZONE Medical Alert - Get Help  I have ANY of these:  I feel awful  Medicine is not helping  Breathing getting harder  Trouble walking or talking  Nose opens wide to breathe       Take your rescue medicine NOW  If your provider has prescribed an oral steroid medicine, start taking it NOW  Call your doctor NOW  If you are still in the Red Zone after 20 minutes and you have not reached your doctor:  Take your rescue medicine again and  Call 911 or go to the emergency room right away    See your regular doctor within 2 weeks of an Emergency Room or Urgent Care visit for  follow-up treatment.          Annual Reminders:  Meet with Asthma Educator,  Flu Shot in the Fall, consider Pneumonia Vaccination for patients with asthma (aged 19 and older).    Pharmacy:    [facility] - [place], [place] - [address] [place] [address] PHARMACY #[id number] [place], [place] - [address]  [address]    Electronically signed by Radha Olsen MD   Date: 04/28/23                    Asthma Triggers  How To Control Things That Make Your Asthma Worse    Triggers are things that make your asthma worse.  Look at the list below to help you find your triggers and   what you can do about them. You can help prevent asthma flare-ups by staying away from your triggers.      Trigger                                                          What you can do   Cigarette Smoke  Tobacco smoke can make asthma worse. Do not allow smoking in your home, car or around you.  Be sure no one smokes at a child s day care or school.  If you smoke, ask your health care provider for ways to help you quit.  Ask family members to quit too.  Ask your health care provider for a referral to Quit Plan to help you quit smoking, or call 1-418-663-PLAN.     Colds, Flu, Bronchitis  These are common triggers of asthma. Wash your hands often.  Don t touch your eyes, nose or mouth.  Get a flu shot every year.     Dust Mites  These are tiny bugs that live in cloth or carpet. They are too small to see. Wash sheets and blankets in hot water every week.   Encase pillows and mattress in dust mite proof covers.  Avoid having carpet if you can. If you have carpet, vacuum weekly.   Use a dust mask and HEPA vacuum.   Pollen and Outdoor Mold  Some people are allergic to trees, grass, or weed pollen, or molds. Try to keep your windows closed.  Limit time out doors when pollen count is high.   Ask you health care provider about taking medicine during allergy season.     Animal Dander  Some people are allergic to skin flakes, urine or  saliva from pets with fur or feathers. Keep pets with fur or feathers out of your home.    If you can t keep the pet outdoors, then keep the pet out of your bedroom.  Keep the bedroom door closed.  Keep pets off cloth furniture and away from stuffed toys.     Mice, Rats, and Cockroaches  Some people are allergic to the waste from these pests.   Cover food and garbage.  Clean up spills and food crumbs.  Store grease in the refrigerator.   Keep food out of the bedroom.   Indoor Mold  This can be a trigger if your home has high moisture. Fix leaking faucets, pipes, or other sources of water.   Clean moldy surfaces.  Dehumidify basement if it is damp and smelly.   Smoke, Strong Odors, and Sprays  These can reduce air quality. Stay away from strong odors and sprays, such as perfume, powder, hair spray, paints, smoke incense, paint, cleaning products, candles and new carpet.   Exercise or Sports  Some people with asthma have this trigger. Be active!  Ask your doctor about taking medicine before sports or exercise to prevent symptoms.    Warm up for 5-10 minutes before and after sports or exercise.     Other Triggers of Asthma  Cold air:  Cover your nose and mouth with a scarf.  Sometimes laughing or crying can be a trigger.  Some medicines and food can trigger asthma.

## 2023-04-28 NOTE — PROGRESS NOTES
SUBJECTIVE:   CC: Madeline is an 47 year old who presents for preventive health visit.        View : No data to display.            Patient has been advised of split billing requirements and indicates understanding: Yes  HPI    Here for a physical. She is doing well. Due for fastin labs, refills on her medications.  Trazodone for sleep  wellbutrin and effexor for depression.  Albuterol for intermittent asthma.    Also having constipation. Last colonoscopy was --discussed with her gynecologist.  Also has seen GI.      Today's PHQ-2 Score:        View : No data to display.                    Social History     Tobacco Use     Smoking status: Never     Passive exposure: Never     Smokeless tobacco: Never   Vaping Use     Vaping status: Not on file   Substance Use Topics     Alcohol use: Yes     Alcohol/week: 3.0 standard drinks of alcohol     Types: 3 Standard drinks or equivalent per week              View : No data to display.            no concerns  Reviewed orders with patient.  Reviewed health maintenance and updated orders accordingly - Yes  BP Readings from Last 3 Encounters:   04/28/23 106/72   11/30/22 108/70   05/04/22 110/78    Wt Readings from Last 3 Encounters:   04/28/23 80.6 kg (177 lb 12.8 oz)   11/30/22 80.6 kg (177 lb 12.8 oz)   05/04/22 75.8 kg (167 lb)                  Patient Active Problem List   Diagnosis     FAMILY HX GI MALIGNANCY     Allergic rhinitis     Health Care Home     Advance care planning     Major depressive disorder, recurrent episode, mild (H)     Mild intermittent asthma without complication     Overweight     Infection due to 2019 novel coronavirus     Past Surgical History:   Procedure Laterality Date     HC EXCISION LESION/TENDON-SHEATH/CAPSULE, FOOT  age 11     HCL PAP THIN LAYER SCREEN  4/2007    Dr Acevedo       Social History     Tobacco Use     Smoking status: Never     Passive exposure: Never     Smokeless tobacco: Never   Vaping Use     Vaping status: Not on file    Substance Use Topics     Alcohol use: Yes     Alcohol/week: 3.0 standard drinks of alcohol     Types: 3 Standard drinks or equivalent per week     Family History   Problem Relation Age of Onset     Lipids Mother      Dementia Mother      Lipids Father      Prostate Cancer Father         diagnosed age 62     Cancer - colorectal Maternal Grandfather          age 70 colon cancer         Current Outpatient Medications   Medication Sig Dispense Refill     albuterol (VENTOLIN HFA) 108 (90 Base) MCG/ACT inhaler INHALE 2 PUFFS INTO THE LUNGS EVERY 6 HOURS AS NEEDED FOR SHORTNESS OF BREATH/ DYSPNEA 18 g 1     buPROPion (WELLBUTRIN XL) 300 MG 24 hr tablet TAKE 1 TABLET BY MOUTH IN THE MORNING 90 tablet 3     estradiol (ESTRACE) 0.5 MG tablet Take 0.5 mg by mouth daily Refilled by OBGYN       gabapentin (NEURONTIN) 100 MG capsule Take 100 mg by mouth 3 times daily Refilled by OBGYN       MIRENA 20 MCG/24HR IU IUD None Entered       traZODone (DESYREL) 50 MG tablet Take 1 tablet (50 mg) by mouth At Bedtime 90 tablet 3     venlafaxine (EFFEXOR-ER) 150 MG 24 hr tablet Take 1 tablet (150 mg) by mouth daily 90 tablet 3       Breast Cancer Screening:  Any new diagnosis of family breast, ovarian, or bowel cancer? Yes       FHS-7:       2022    11:12 AM 2023    11:25 AM   Breast CA Risk Assessment (FHS-7)   Did any of your first-degree relatives have breast or ovarian cancer? Yes Yes   Did any of your relatives have bilateral breast cancer? No Unknown   Did any man in your family have breast cancer? No No   Did any woman in your family have breast and ovarian cancer? No No   Did any woman in your family have breast cancer before age 50 y? No Yes   Do you have 2 or more relatives with breast and/or ovarian cancer? No Yes   Do you have 2 or more relatives with breast and/or bowel cancer? Yes Yes     Mammogram and breast exams through gynecology. Would like to see genetic counseling    Pertinent mammograms are reviewed  "under the imaging tab.    History of abnormal Pap smear: pap through gynecology     Reviewed and updated as needed this visit by clinical staff   Tobacco  Allergies   Problems             Reviewed and updated as needed this visit by Provider                 Past Medical History:   Diagnosis Date     History of elevated glucose     in pregnancy      Past Surgical History:   Procedure Laterality Date     HC EXCISION LESION/TENDON-SHEATH/CAPSULE, FOOT  age 11     HCL PAP THIN LAYER SCREEN  4/2007    Dr Acevedo       Review of Systems  CONSTITUTIONAL: NEGATIVE for fever, chills, change in weight  INTEGUMENTARY/SKIN: NEGATIVE for worrisome rashes, moles or lesions  EYES: NEGATIVE for vision changes or irritation  ENT: NEGATIVE for ear, mouth and throat problems  RESP: NEGATIVE for significant cough or SOB  BREAST: NEGATIVE for masses, tenderness or discharge  CV: NEGATIVE for chest pain, palpitations or peripheral edema  GI: NEGATIVE for nausea, abdominal pain, heartburn, or change in bowel habits  : NEGATIVE for unusual urinary or vaginal symptoms. No vaginal bleeding.  MUSCULOSKELETAL: NEGATIVE for significant arthralgias or myalgia  NEURO: NEGATIVE for weakness, dizziness or paresthesias  PSYCHIATRIC: NEGATIVE for changes in mood or affect      OBJECTIVE:   /72 (BP Location: Right arm, Patient Position: Sitting, Cuff Size: Adult Large)   Pulse 85   Temp 98.6  F (37  C) (Temporal)   Ht 1.753 m (5' 9\")   Wt 80.6 kg (177 lb 12.8 oz)   SpO2 98%   BMI 26.26 kg/m    Physical Exam  GENERAL: healthy, alert and no distress  EYES: Eyes grossly normal to inspection, PERRL and conjunctivae and sclerae normal  HENT: ear canals and TM's normal, nose and mouth without ulcers or lesions  NECK: no adenopathy, no asymmetry, masses, or scars and thyroid normal to palpation  RESP: lungs clear to auscultation - no rales, rhonchi or wheezes  CV: regular rate and rhythm, normal S1 S2, no S3 or S4, no murmur, click or rub, no " peripheral edema and peripheral pulses strong  ABDOMEN: soft, nontender, no hepatosplenomegaly, no masses and bowel sounds normal  MS: no gross musculoskeletal defects noted, no edema  SKIN: no suspicious lesions or rashes  NEURO: Normal strength and tone, mentation intact and speech normal  PSYCH: mentation appears normal, affect normal/bright  Patient declined breast and pelvic exams    Diagnostic Test Results:  Labs reviewed in Epic  No results found for any visits on 04/28/23.    ASSESSMENT/PLAN:   1. Encounter for routine history and physical exam in female patient    - VENOUS COLLECTION  - Lipid Panel (BFP)  - Basic Metabolic Panel (BFP)    2. Mild intermittent asthma without complication  Refilled.  - albuterol (VENTOLIN HFA) 108 (90 Base) MCG/ACT inhaler; INHALE 2 PUFFS INTO THE LUNGS EVERY 6 HOURS AS NEEDED FOR SHORTNESS OF BREATH/ DYSPNEA  Dispense: 18 g; Refill: 1  - Asthma Action Plan (AAP)    3. Major depressive disorder, recurrent episode, mild (H)  Controlled on medications, refilled.  - buPROPion (WELLBUTRIN XL) 300 MG 24 hr tablet; TAKE 1 TABLET BY MOUTH IN THE MORNING  Dispense: 90 tablet; Refill: 3  - venlafaxine (EFFEXOR-ER) 150 MG 24 hr tablet; Take 1 tablet (150 mg) by mouth daily  Dispense: 90 tablet; Refill: 3    4. Insomnia, unspecified type  Refilled.  - traZODone (DESYREL) 50 MG tablet; Take 1 tablet (50 mg) by mouth At Bedtime  Dispense: 90 tablet; Refill: 3    5. Other constipation  She has discussed with GI and gynecology    6. Family history of malignant neoplasm of breast  refrral done.  - Other Specialty Referral    7. Need for hepatitis C screening test    - HEPATITIS C ANTIBODY ([facility])    Patient has been advised of split billing requirements and indicates understanding: Yes      COUNSELING:  Reviewed preventive health counseling, as reflected in patient instructions       Regular exercise       Healthy diet/nutrition      BMI:   Estimated body mass index is 26.26 kg/m  as  "calculated from the following:    Height as of this encounter: 1.753 m (5' 9\").    Weight as of this encounter: 80.6 kg (177 lb 12.8 oz).         She reports that she has never smoked. She has never been exposed to tobacco smoke. She has never used smokeless tobacco.      Radha Olsen MD  [facility]  "

## 2023-05-01 ENCOUNTER — TELEPHONE (OUTPATIENT)
Dept: FAMILY MEDICINE | Facility: CLINIC | Age: 48
End: 2023-05-01

## 2023-05-01 LAB
HCV AB - QUEST: NORMAL
SIGNAL TO CUT OFF - QUEST: 0.06

## 2023-05-01 RX ORDER — VENLAFAXINE HYDROCHLORIDE 75 MG/1
75 CAPSULE, EXTENDED RELEASE ORAL DAILY
Qty: 90 CAPSULE | Refills: 1 | Status: SHIPPED | OUTPATIENT
Start: 2023-05-01 | End: 2023-05-25

## 2023-05-01 RX ORDER — VENLAFAXINE HYDROCHLORIDE 75 MG/1
75 CAPSULE, EXTENDED RELEASE ORAL DAILY
Qty: 90 CAPSULE | Refills: 1 | Status: CANCELLED | OUTPATIENT
Start: 2023-05-01

## 2023-05-01 NOTE — TELEPHONE ENCOUNTER
Patient called into the CS line and left a message stating that she only got the 150 mg of venlafaxine and not the 75 mg-she takes both to make it a total of 225 mg per day. Routing to Dr. Olsen to send in.     Pending Prescriptions:                       Disp   Refills    venlafaxine (EFFEXOR XR) 75 MG 24 hr caps*90 cap*1            Sig: Take 1 capsule (75 mg) by mouth daily

## 2023-05-01 NOTE — TELEPHONE ENCOUNTER
Patient stated that at the time last year she was going to go down in dose however she did talk with Deanna a month or two after that and said that she realized she did need to take the 225 daily as she is much more stable on this so she has been taking it ever since. She also stated that she was taking phentermine for awhile to help with her weight and stopped when she transferred care here from [facility] but her doctor there was prescribing this for her. I did inform her that we typically refer patients to [facility] or other places to manage this medication but with her insurance now she is not able to afford seeing a specialist. She is wondering if she does a virtual visit to discuss this more if Dr. Olsen is willing to prescribe this for her. Routing to Dr. Olsen for review.      Pending Prescriptions:                       Disp   Refills    venlafaxine (EFFEXOR XR) 75 MG 24 hr caps*90 cap*1            Sig: Take 1 capsule (75 mg) by mouth daily

## 2023-05-01 NOTE — TELEPHONE ENCOUNTER
I sent over the extra dose of effexor xr, to total 225 mg/day.  I don't manage weight loss and phentermine. She can see [facility], if she needs a referral schedule an apt to discuss.

## 2023-05-02 NOTE — TELEPHONE ENCOUNTER
Informed patient that this was done. [place] [facility] [place] [facility] to put through referral.

## 2023-05-25 ENCOUNTER — MYC MEDICAL ADVICE (OUTPATIENT)
Dept: FAMILY MEDICINE | Facility: CLINIC | Age: 48
End: 2023-05-25

## 2023-05-25 DIAGNOSIS — F33.0 MAJOR DEPRESSIVE DISORDER, RECURRENT EPISODE, MILD (H): Primary | ICD-10-CM

## 2023-05-25 NOTE — TELEPHONE ENCOUNTER
Please advise, pt last seen 04/28/23 you ok'd her for 1 year. She would like a script for 225 rather than a 150 and 75 separate.    Madeline Castillo is requesting a refill of:    Pending Prescriptions:                       Disp   Refills    venlafaxine (EFFEXOR-ER) 225 MG 24 hr tab*90 tab*3            Sig: Take 1 tablet (225 mg) by mouth daily

## 2023-05-26 RX ORDER — VENLAFAXINE HYDROCHLORIDE 225 MG/1
225 TABLET, EXTENDED RELEASE ORAL DAILY
Qty: 90 TABLET | Refills: 3 | Status: SHIPPED | OUTPATIENT
Start: 2023-05-26 | End: 2023-08-14

## 2023-08-14 DIAGNOSIS — F33.0 MAJOR DEPRESSIVE DISORDER, RECURRENT EPISODE, MILD (H): Primary | ICD-10-CM

## 2023-08-14 RX ORDER — VENLAFAXINE HYDROCHLORIDE 75 MG/1
75 CAPSULE, EXTENDED RELEASE ORAL 3 TIMES DAILY
Qty: 270 CAPSULE | Refills: 2 | Status: SHIPPED | OUTPATIENT
Start: 2023-08-14 | End: 2024-05-23

## 2023-08-14 NOTE — TELEPHONE ENCOUNTER
Sent. Call her and let her know that I advise to come in to be seen, this is quite a high dose. And there is an interaction with trazodone.

## 2023-08-14 NOTE — TELEPHONE ENCOUNTER
Pt needs new RX sent for venlafaxine. Her insurance would not cover 225MG, she rather take 75MG three times daily.    Madeline Castillo is requesting a refill of:    Pending Prescriptions:                       Disp   Refills    venlafaxine (EFFEXOR XR) 75 MG 24 hr caps*270 ca*2            Sig: Take 1 capsule (75 mg) by mouth 3 times daily

## 2024-03-21 ENCOUNTER — OFFICE VISIT (OUTPATIENT)
Dept: FAMILY MEDICINE | Facility: CLINIC | Age: 49
End: 2024-03-21

## 2024-03-21 VITALS
SYSTOLIC BLOOD PRESSURE: 112 MMHG | BODY MASS INDEX: 26.51 KG/M2 | HEIGHT: 69 IN | HEART RATE: 87 BPM | WEIGHT: 179 LBS | OXYGEN SATURATION: 99 % | DIASTOLIC BLOOD PRESSURE: 68 MMHG | TEMPERATURE: 97.7 F

## 2024-03-21 DIAGNOSIS — M25.561 ACUTE PAIN OF RIGHT KNEE: Primary | ICD-10-CM

## 2024-03-21 DIAGNOSIS — M79.672 LEFT FOOT PAIN: ICD-10-CM

## 2024-03-21 PROCEDURE — 73630 X-RAY EXAM OF FOOT: CPT | Mod: LT | Performed by: PHYSICIAN ASSISTANT

## 2024-03-21 PROCEDURE — 73562 X-RAY EXAM OF KNEE 3: CPT | Mod: RT | Performed by: PHYSICIAN ASSISTANT

## 2024-03-21 PROCEDURE — 99214 OFFICE O/P EST MOD 30 MIN: CPT | Performed by: PHYSICIAN ASSISTANT

## 2024-03-21 PROCEDURE — 36415 COLL VENOUS BLD VENIPUNCTURE: CPT | Performed by: PHYSICIAN ASSISTANT

## 2024-03-21 PROCEDURE — 84550 ASSAY OF BLOOD/URIC ACID: CPT | Performed by: PHYSICIAN ASSISTANT

## 2024-03-21 NOTE — PROGRESS NOTES
"  Assessment & Plan     Acute pain of right knee-rule out gout with uric acid. Suspect swelling causing symptoms, will treat with more aggressive anti-inflammatory regimen and refer to [facility] if no improvement in next 1-2 weeks  Add tylenol, scheduled diclofenac  - XR Knee Right 3 Views  - VENOUS COLLECTION  - Uric Acid (BFP)  - diclofenac (VOLTAREN) 50 MG EC tablet  Dispense: 30 tablet; Refill: 0    Left foot pain-suspect will resolve on its own, no concerning signs on PE    - X-ray lt Foot G/E 3 vws*        Follow up pending XR interpretation, as needed    No follow-ups on file.    Roman Correa is a 48 year old, presenting for the following health issues:  Knee Pain (Right knee pain since a fall 3 weeks ago) and Foot Pain (Left foot pain, tripped on her toe and then fell on the right knee)    HPI     Pt fell 3 weeks ago-tripped over L foot (had an accident as a child) going down 2 steps in garage, landed on R knee. L toe bent severely which caused some pain. Since the fall, doing OK, but pain continues. Pain in R knee feels like it is \"on fire\". Worse with sitting or lying-feels swollen. Landed directly on kneecap. No knee instability or knee giving out. Walking well. Using advil, ice on area.     L foot continues to ache and be swollen-less severe vs knee.       Review of Systems  Constitutional, HEENT, cardiovascular, pulmonary, gi and gu systems are negative, except as otherwise noted.      Objective    /68 (BP Location: Left arm, Patient Position: Sitting, Cuff Size: Adult Large)   Pulse 87   Temp 97.7  F (36.5  C) (Temporal)   Ht 1.753 m (5' 9\")   Wt 81.2 kg (179 lb)   SpO2 99%   BMI 26.43 kg/m    Body mass index is 26.43 kg/m .  Physical Exam   GENERAL: alert and no distress  NECK: no adenopathy, no asymmetry, masses, or scars  RESP: lungs clear to auscultation - no rales, rhonchi or wheezes  CV: regular rate and rhythm, normal S1 S2, no S3 or S4, no murmur, click or rub, no peripheral " edema  ABDOMEN: soft, nontender, no hepatosplenomegaly, no masses and bowel sounds normal  MS: L foot: no tenderness on palpation. R knee: mild to moderate swelling and fluid noted around kneecap, mild pain on palpation of joint, normal ROM (pain with full extension), no joint instability noted, some crepitus noted with ROM testing  SKIN: no suspicious lesions or rashes  NEURO: Normal strength and tone, mentation intact and speech normal    Xray - Reviewed and interpreted by me.  L foot and R knee: no acute fractures noted, await formal interpretation by radiologist        Signed Electronically by: Faizan Street PA-C

## 2024-03-21 NOTE — NURSING NOTE
Chief Complaint   Patient presents with    Knee Pain     Right knee pain since a fall 3 weeks ago    Foot Pain     Left foot pain, tripped on her toe and then fell on the right knee         Pre-visit Screening:  Immunizations:  up to date  Colonoscopy:  is up to date  Mammogram: is up to date  Asthma Action Test/Plan:  Yes  PHQ9:  Goes to OBGYN for meds  GAD7:  Goes to OBGYN for meds  Questioned patient about current smoking habits Pt. has never smoked.  Ok to leave detailed message on voice mail for today's visit only Yes, phone # [phone]

## 2024-03-22 LAB — URIC ACID (BFP): 4.9 (ref 2.5–7)

## 2024-04-09 ENCOUNTER — MYC MEDICAL ADVICE (OUTPATIENT)
Dept: FAMILY MEDICINE | Facility: CLINIC | Age: 49
End: 2024-04-09

## 2024-04-09 DIAGNOSIS — M25.561 ACUTE PAIN OF RIGHT KNEE: Primary | ICD-10-CM

## 2024-04-10 NOTE — TELEPHONE ENCOUNTER
MRI ordered placed, ongoing swelling and pain concerning for structural damage not seen on XR  Faizan Street PA-C  [facility]

## 2024-04-24 ENCOUNTER — HOSPITAL ENCOUNTER (OUTPATIENT)
Dept: MRI IMAGING | Facility: CLINIC | Age: 49
Discharge: HOME | End: 2024-04-24
Attending: PHYSICIAN ASSISTANT | Admitting: PHYSICIAN ASSISTANT
Payer: COMMERCIAL

## 2024-04-24 PROCEDURE — 73721 MRI JNT OF LWR EXTRE W/O DYE: CPT | Mod: RT

## 2024-04-30 ENCOUNTER — LAB REQUISITION (OUTPATIENT)
Dept: LAB | Facility: CLINIC | Age: 49
End: 2024-04-30
Payer: COMMERCIAL

## 2024-04-30 DIAGNOSIS — Z01.419 ENCOUNTER FOR GYNECOLOGICAL EXAMINATION (GENERAL) (ROUTINE) WITHOUT ABNORMAL FINDINGS: ICD-10-CM

## 2024-04-30 PROCEDURE — G0145 SCR C/V CYTO,THINLAYER,RESCR: HCPCS | Mod: ORL | Performed by: OBSTETRICS & GYNECOLOGY

## 2024-04-30 PROCEDURE — 87624 HPV HI-RISK TYP POOLED RSLT: CPT | Mod: ORL | Performed by: OBSTETRICS & GYNECOLOGY

## 2024-05-03 LAB
BKR LAB AP GYN ADEQUACY: NORMAL
BKR LAB AP GYN INTERPRETATION: NORMAL
BKR LAB AP HPV REFLEX: NORMAL
BKR LAB AP LMP: NORMAL
BKR LAB AP PREVIOUS ABNL DX: NORMAL
BKR LAB AP PREVIOUS ABNORMAL: NORMAL
PATH REPORT.COMMENTS IMP SPEC: NORMAL
PATH REPORT.COMMENTS IMP SPEC: NORMAL
PATH REPORT.RELEVANT HX SPEC: NORMAL

## 2024-05-08 LAB
HUMAN PAPILLOMA VIRUS 16 DNA: NEGATIVE
HUMAN PAPILLOMA VIRUS 18 DNA: NEGATIVE
HUMAN PAPILLOMA VIRUS FINAL DIAGNOSIS: NORMAL
HUMAN PAPILLOMA VIRUS OTHER HR: NEGATIVE

## 2024-05-09 DIAGNOSIS — F33.0 MAJOR DEPRESSIVE DISORDER, RECURRENT EPISODE, MILD (H): ICD-10-CM

## 2024-05-09 RX ORDER — VENLAFAXINE HYDROCHLORIDE 75 MG/1
75 CAPSULE, EXTENDED RELEASE ORAL 3 TIMES DAILY
COMMUNITY
Start: 2024-05-09

## 2024-05-09 NOTE — TELEPHONE ENCOUNTER
Madeline Castillo is requesting a refill of:    Refused Prescriptions:                       Disp   Refills    venlafaxine (EFFEXOR XR) 75 MG 24 hr capsu*                Sig: TAKE ONE CAPSULE BY MOUTH THREE TIMES A DAY  Refused By: JAHAIRA ONEILL  Reason for Refusal: Patient needs appointment    Pt due for FASTING CPX or OV

## 2024-05-10 ENCOUNTER — HOSPITAL ENCOUNTER (OUTPATIENT)
Dept: CT IMAGING | Facility: CLINIC | Age: 49
Discharge: HOME | End: 2024-05-10
Attending: INTERNAL MEDICINE | Admitting: INTERNAL MEDICINE
Payer: COMMERCIAL

## 2024-05-10 DIAGNOSIS — K59.00 CONSTIPATION: ICD-10-CM

## 2024-05-10 DIAGNOSIS — R10.32 ABDOMINAL PAIN, LEFT LOWER QUADRANT: ICD-10-CM

## 2024-05-10 PROCEDURE — 74177 CT ABD & PELVIS W/CONTRAST: CPT

## 2024-05-10 PROCEDURE — 250N000011 HC RX IP 250 OP 636: Performed by: INTERNAL MEDICINE

## 2024-05-10 PROCEDURE — 250N000009 HC RX 250: Performed by: INTERNAL MEDICINE

## 2024-05-10 RX ORDER — IOPAMIDOL 755 MG/ML
500 INJECTION, SOLUTION INTRAVASCULAR ONCE
Status: COMPLETED | OUTPATIENT
Start: 2024-05-10 | End: 2024-05-10

## 2024-05-10 RX ADMIN — IOPAMIDOL 90 ML: 755 INJECTION, SOLUTION INTRAVENOUS at 09:22

## 2024-05-10 RX ADMIN — SODIUM CHLORIDE 100 ML: 9 INJECTION, SOLUTION INTRAVENOUS at 09:22

## 2024-05-13 ENCOUNTER — TRANSFERRED RECORDS (OUTPATIENT)
Dept: FAMILY MEDICINE | Facility: CLINIC | Age: 49
End: 2024-05-13

## 2024-05-22 NOTE — PROGRESS NOTES
Assessment & Plan   Problem List Items Addressed This Visit          Respiratory    Mild intermittent asthma without complication    Relevant Medications    albuterol (VENTOLIN HFA) 108 (90 Base) MCG/ACT inhaler       Behavioral    Major depressive disorder, recurrent episode, mild (H24)    Relevant Medications    buPROPion (WELLBUTRIN XL) 300 MG 24 hr tablet    traZODone (DESYREL) 50 MG tablet    venlafaxine (EFFEXOR XR) 75 MG 24 hr capsule    Other Relevant Orders    Other Specialty Referral       Other    Overweight    Relevant Orders    Adult Comprehensive Weight Management [facility] Referral - To a [facility] Location (Use POS/Location)     Other Visit Diagnoses       Anxiety    -  Primary    Relevant Medications    buPROPion (WELLBUTRIN XL) 300 MG 24 hr tablet    traZODone (DESYREL) 50 MG tablet    venlafaxine (EFFEXOR XR) 75 MG 24 hr capsule    Other Relevant Orders    Other Specialty Referral    Acute pain of right knee        Insomnia, unspecified type        Relevant Medications    traZODone (DESYREL) 50 MG tablet           1. Mild intermittent asthma without complication  Refilled albuterol.  - albuterol (VENTOLIN HFA) 108 (90 Base) MCG/ACT inhaler; INHALE 2 PUFFS INTO THE LUNGS EVERY 6 HOURS AS NEEDED FOR SHORTNESS OF BREATH/ DYSPNEA  Dispense: 18 g; Refill: 1    2. Major depressive disorder, recurrent episode, mild (H24)  Initially she wanted to stop the wellbutrin but then changed her mind and wants to continue this. Anxiety and depression are not controlled so she is wondering what else to do. I recommend a referral to see psychiatry.  - Other Specialty Referral  - buPROPion (WELLBUTRIN XL) 300 MG 24 hr tablet; TAKE 1 TABLET BY MOUTH IN THE MORNING  Dispense: 90 tablet; Refill: 0  - venlafaxine (EFFEXOR XR) 75 MG 24 hr capsule; Take 1 capsule (75 mg) by mouth 3 times daily  Dispense: 270 capsule; Refill: 1    3. Acute pain of right knee  Followed by [facility]co.    4. Insomnia, unspecified  "type  Insomnia, controlled on medications.  - traZODone (DESYREL) 50 MG tablet; Take 1 tablet (50 mg) by mouth at bedtime  Dispense: 90 tablet; Refill: 1    5. Anxiety  See above.  - Other Specialty Referral    6. Overweight  She wants a pill prescribed. Hasn't yet checked with insurance on injectible weight loss medications. I discussed her options but she first must call her insurance, then schedule an apt with me in clinic to discuss further. She decided that she would prefer a referral to weight loss clinic to discuss all options.  - Adult Comprehensive Weight Management  Referral - To a [facility] Location (Use POS/Location)            BMI  Estimated body mass index is 25.84 kg/m  as calculated from the following:    Height as of 3/21/24: 1.753 m (5' 9\").    Weight as of this encounter: 79.4 kg (175 lb).         FUTURE APPOINTMENTS:       - Follow-up visit in 6 months, we manage her chronic medical care.    No follow-ups on file.    Radha Olsen MD  [facility]    Subjective     Nursing Notes:   Nellie Encarnacion CMA  5/23/2024 10:44 AM  Signed  Chief Complaint   Patient presents with    Recheck Medication     Non-fasting today, refill medications     Pre-visit Screening:  Immunizations:  not up to date - shingrix at pharmacy  Colonoscopy:  is up to date  Mammogram: is up to date  Asthma Action Test/Plan:  NA  PHQ9:  Done today  GAD7:  Done today  Questioned patient about current smoking habits Pt. has never smoked.  Ok to leave detailed message on voice mail for today's visit only Yes, phone # [phone]       Madeline Castillo is a 48 year old female who presents to clinic today for the following health issues   HPI     Here for medication refills: wellbutrin and effexor, not sure if the wellbutrin is doing anything for her. Has anxiety issues so she didn't want to stop it but not sure if it's helping. She wants to go on another medication. Is ok to see psychiatry  Is " also on trazodone for sleep.    Is on diclofenc for knee pain. Is seeing [facility]co for this, chronic right knee, fell on her knee    Constipation, Is taking a medication for this. Prescribed by [facility]. Is on a medication that starts with L. Something isn't working for her.     Weight loss medication--wants a prescription, wegovy.  Hasn't checked yet for her insurance.     Used to take phentermine this works well for her. Would like this again.     The 10-year ASCVD risk score (Marissa LOUISE, et al., 2019) is: 1%    Values used to calculate the score:      Age: 48 years      Sex: Female      Is Non- : No      Diabetic: No      Tobacco smoker: No      Systolic Blood Pressure: 106 mmHg      Is BP treated: No      HDL Cholesterol: 61 mg/dL      Total Cholesterol: 245 mg/dL         Review of Systems   Constitutional, HEENT, cardiovascular, pulmonary, gi and gu systems are negative, except as otherwise noted.      Objective    /70 (BP Location: Right arm, Patient Position: Sitting, Cuff Size: Adult Large)   Pulse 83   Temp 98.3  F (36.8  C) (Temporal)   Wt 79.4 kg (175 lb)   SpO2 99%   BMI 25.84 kg/m    Body mass index is 25.84 kg/m .  Physical Exam   GENERAL: alert and no distress  RESP: lungs clear to auscultation - no rales, rhonchi or wheezes  CV: regular rate and rhythm, normal S1 S2, no S3 or S4, no murmur, click or rub, no peripheral edema  MS: no gross musculoskeletal defects noted, no edema  NEURO: Normal strength and tone, mentation intact and speech normal  PSYCH: mentation appears normal, affect normal/bright    No results found for any visits on 05/23/24.

## 2024-05-23 ENCOUNTER — OFFICE VISIT (OUTPATIENT)
Dept: FAMILY MEDICINE | Facility: CLINIC | Age: 49
End: 2024-05-23

## 2024-05-23 VITALS
SYSTOLIC BLOOD PRESSURE: 106 MMHG | OXYGEN SATURATION: 99 % | BODY MASS INDEX: 25.84 KG/M2 | WEIGHT: 175 LBS | DIASTOLIC BLOOD PRESSURE: 70 MMHG | HEART RATE: 83 BPM | TEMPERATURE: 98.3 F

## 2024-05-23 DIAGNOSIS — F33.0 MAJOR DEPRESSIVE DISORDER, RECURRENT EPISODE, MILD (H): ICD-10-CM

## 2024-05-23 DIAGNOSIS — M25.561 ACUTE PAIN OF RIGHT KNEE: ICD-10-CM

## 2024-05-23 DIAGNOSIS — E66.3 OVERWEIGHT: ICD-10-CM

## 2024-05-23 DIAGNOSIS — J45.20 MILD INTERMITTENT ASTHMA WITHOUT COMPLICATION: ICD-10-CM

## 2024-05-23 DIAGNOSIS — F41.9 ANXIETY: Primary | ICD-10-CM

## 2024-05-23 DIAGNOSIS — G47.00 INSOMNIA, UNSPECIFIED TYPE: ICD-10-CM

## 2024-05-23 PROCEDURE — G2211 COMPLEX E/M VISIT ADD ON: HCPCS | Performed by: FAMILY MEDICINE

## 2024-05-23 PROCEDURE — 99214 OFFICE O/P EST MOD 30 MIN: CPT | Performed by: FAMILY MEDICINE

## 2024-05-23 RX ORDER — BUPROPION HYDROCHLORIDE 300 MG/1
TABLET ORAL
Qty: 90 TABLET | Refills: 0 | Status: SHIPPED | OUTPATIENT
Start: 2024-05-23

## 2024-05-23 RX ORDER — TRAZODONE HYDROCHLORIDE 50 MG/1
50 TABLET, FILM COATED ORAL AT BEDTIME
Qty: 90 TABLET | Refills: 1 | Status: SHIPPED | OUTPATIENT
Start: 2024-05-23

## 2024-05-23 RX ORDER — VENLAFAXINE HYDROCHLORIDE 75 MG/1
75 CAPSULE, EXTENDED RELEASE ORAL 3 TIMES DAILY
Qty: 270 CAPSULE | Refills: 1 | Status: SHIPPED | OUTPATIENT
Start: 2024-05-23

## 2024-05-23 RX ORDER — VENLAFAXINE HYDROCHLORIDE 75 MG/1
75 CAPSULE, EXTENDED RELEASE ORAL 3 TIMES DAILY
Qty: 270 CAPSULE | Refills: 3 | Status: SHIPPED | OUTPATIENT
Start: 2024-05-23 | End: 2024-05-23

## 2024-05-23 RX ORDER — ALBUTEROL SULFATE 90 UG/1
AEROSOL, METERED RESPIRATORY (INHALATION)
Qty: 18 G | Refills: 1 | Status: SHIPPED | OUTPATIENT
Start: 2024-05-23

## 2024-05-23 RX ORDER — TRAZODONE HYDROCHLORIDE 50 MG/1
50 TABLET, FILM COATED ORAL AT BEDTIME
Qty: 90 TABLET | Refills: 3 | Status: SHIPPED | OUTPATIENT
Start: 2024-05-23 | End: 2024-05-23

## 2024-05-23 RX ORDER — BUPROPION HYDROCHLORIDE 300 MG/1
TABLET ORAL
Qty: 90 TABLET | Refills: 3 | Status: CANCELLED | OUTPATIENT
Start: 2024-05-23

## 2024-05-23 ASSESSMENT — ANXIETY QUESTIONNAIRES
GAD7 TOTAL SCORE: 5
7. FEELING AFRAID AS IF SOMETHING AWFUL MIGHT HAPPEN: SEVERAL DAYS
6. BECOMING EASILY ANNOYED OR IRRITABLE: SEVERAL DAYS
IF YOU CHECKED OFF ANY PROBLEMS ON THIS QUESTIONNAIRE, HOW DIFFICULT HAVE THESE PROBLEMS MADE IT FOR YOU TO DO YOUR WORK, TAKE CARE OF THINGS AT HOME, OR GET ALONG WITH OTHER PEOPLE: SOMEWHAT DIFFICULT
3. WORRYING TOO MUCH ABOUT DIFFERENT THINGS: MORE THAN HALF THE DAYS
GAD7 TOTAL SCORE: 5
1. FEELING NERVOUS, ANXIOUS, OR ON EDGE: NOT AT ALL
2. NOT BEING ABLE TO STOP OR CONTROL WORRYING: SEVERAL DAYS
5. BEING SO RESTLESS THAT IT IS HARD TO SIT STILL: NOT AT ALL

## 2024-05-23 ASSESSMENT — ASTHMA QUESTIONNAIRES: ACT_TOTALSCORE: 24

## 2024-05-23 ASSESSMENT — PATIENT HEALTH QUESTIONNAIRE - PHQ9
SUM OF ALL RESPONSES TO PHQ QUESTIONS 1-9: 10
5. POOR APPETITE OR OVEREATING: NOT AT ALL

## 2024-05-23 NOTE — LETTER
My Asthma Action Plan    Name: Madeline Castillo   YOB: 1975  Date: 5/23/2024   My doctor: Radha Olsen MD   My clinic: [facility]        My Rescue Medicine:   Albuterol inhaler (Proair/Ventolin/Proventil HFA)  2-4 puffs EVERY 4 HOURS as needed. Use a spacer if recommended by your provider.   My Asthma Severity:   Intermittent / Exercise Induced  Know your asthma triggers:     None          GREEN ZONE   Good Control  I feel good  No cough or wheeze  Can work, sleep and play without asthma symptoms       Take your asthma control medicine every day.     If exercise triggers your asthma, take your rescue medication  15 minutes before exercise or sports, and  During exercise if you have asthma symptoms  Spacer to use with inhaler: If you have a spacer, make sure to use it with your inhaler             YELLOW ZONE Getting Worse  I have ANY of these:  I do not feel good  Cough or wheeze  Chest feels tight  Wake up at night   Keep taking your Green Zone medications  Start taking your rescue medicine:  every 20 minutes for up to 1 hour. Then every 4 hours for 24-48 hours.  If you stay in the Yellow Zone for more than 12-24 hours, contact your doctor.  If you do not return to the Green Zone in 12-24 hours or you get worse, start taking your oral steroid medicine if prescribed by your provider.           RED ZONE Medical Alert - Get Help  I have ANY of these:  I feel awful  Medicine is not helping  Breathing getting harder  Trouble walking or talking  Nose opens wide to breathe       Take your rescue medicine NOW  If your provider has prescribed an oral steroid medicine, start taking it NOW  Call your doctor NOW  If you are still in the Red Zone after 20 minutes and you have not reached your doctor:  Take your rescue medicine again and  Call 911 or go to the emergency room right away    See your regular doctor within 2 weeks of an Emergency Room or Urgent Care visit for follow-up treatment.           Annual Reminders:  Meet with Asthma Educator,  Flu Shot in the Fall, consider Pneumonia Vaccination for patients with asthma (aged 19 and older).    Pharmacy:    [facility] - [place], [place] - [address] [facility] [id number] - [place], [place] - [address]  [id number] - [place], [place] - [address]    Electronically signed by Radha Olsen MD   Date: 05/23/24                    Asthma Triggers  How To Control Things That Make Your Asthma Worse    Triggers are things that make your asthma worse.  Look at the list below to help you find your triggers and   what you can do about them. You can help prevent asthma flare-ups by staying away from your triggers.      Trigger                                                          What you can do   Cigarette Smoke  Tobacco smoke can make asthma worse. Do not allow smoking in your home, car or around you.  Be sure no one smokes at a child s day care or school.  If you smoke, ask your health care provider for ways to help you quit.  Ask family members to quit too.  Ask your health care provider for a referral to Quit Plan to help you quit smoking, or call 9-138-579-PLAN.     Colds, Flu, Bronchitis  These are common triggers of asthma. Wash your hands often.  Don t touch your eyes, nose or mouth.  Get a flu shot every year.     Dust Mites  These are tiny bugs that live in cloth or carpet. They are too small to see. Wash sheets and blankets in hot water every week.   Encase pillows and mattress in dust mite proof covers.  Avoid having carpet if you can. If you have carpet, vacuum weekly.   Use a dust mask and HEPA vacuum.   Pollen and Outdoor Mold  Some people are allergic to trees, grass, or weed pollen, or molds. Try to keep your windows closed.  Limit time out doors when pollen count is high.   Ask you health care provider about taking medicine during allergy season.      Animal Dander  Some people are allergic to skin flakes, urine or saliva from pets with fur or feathers. Keep pets with fur or feathers out of your home.    If you can t keep the pet outdoors, then keep the pet out of your bedroom.  Keep the bedroom door closed.  Keep pets off cloth furniture and away from stuffed toys.     Mice, Rats, and Cockroaches  Some people are allergic to the waste from these pests.   Cover food and garbage.  Clean up spills and food crumbs.  Store grease in the refrigerator.   Keep food out of the bedroom.   Indoor Mold  This can be a trigger if your home has high moisture. Fix leaking faucets, pipes, or other sources of water.   Clean moldy surfaces.  Dehumidify basement if it is damp and smelly.   Smoke, Strong Odors, and Sprays  These can reduce air quality. Stay away from strong odors and sprays, such as perfume, powder, hair spray, paints, smoke incense, paint, cleaning products, candles and new carpet.   Exercise or Sports  Some people with asthma have this trigger. Be active!  Ask your doctor about taking medicine before sports or exercise to prevent symptoms.    Warm up for 5-10 minutes before and after sports or exercise.     Other Triggers of Asthma  Cold air:  Cover your nose and mouth with a scarf.  Sometimes laughing or crying can be a trigger.  Some medicines and food can trigger asthma.

## 2024-05-23 NOTE — NURSING NOTE
Chief Complaint   Patient presents with    Recheck Medication     Non-fasting today, refill medications     Pre-visit Screening:  Immunizations:  not up to date - shingrix at pharmacy  Colonoscopy:  is up to date  Mammogram: is up to date  Asthma Action Test/Plan:  NA  PHQ9:  Done today  GAD7:  Done today  Questioned patient about current smoking habits Pt. has never smoked.  Ok to leave detailed message on voice mail for today's visit only Yes, phone # [phone]

## 2024-06-23 ENCOUNTER — HEALTH MAINTENANCE LETTER (OUTPATIENT)
Age: 49
End: 2024-06-23

## 2024-06-25 DIAGNOSIS — M25.561 ACUTE PAIN OF RIGHT KNEE: ICD-10-CM

## 2024-09-20 ENCOUNTER — LAB REQUISITION (OUTPATIENT)
Dept: LAB | Facility: CLINIC | Age: 49
End: 2024-09-20
Payer: COMMERCIAL

## 2024-09-20 DIAGNOSIS — Z13.9 ENCOUNTER FOR SCREENING, UNSPECIFIED: ICD-10-CM

## 2024-09-20 LAB
ALBUMIN SERPL BCG-MCNC: 4.5 G/DL (ref 3.5–5.2)
ALP SERPL-CCNC: 77 U/L (ref 40–150)
ALT SERPL W P-5'-P-CCNC: 19 U/L (ref 0–50)
AST SERPL W P-5'-P-CCNC: 23 U/L (ref 0–45)
BASOPHILS # BLD AUTO: 0 10E3/UL (ref 0–0.2)
BASOPHILS NFR BLD AUTO: 0 %
BILIRUB DIRECT SERPL-MCNC: <0.2 MG/DL (ref 0–0.3)
BILIRUB SERPL-MCNC: 0.3 MG/DL
CHOLEST SERPL-MCNC: 211 MG/DL
EOSINOPHIL # BLD AUTO: 0 10E3/UL (ref 0–0.7)
EOSINOPHIL NFR BLD AUTO: 1 %
ERYTHROCYTE [DISTWIDTH] IN BLOOD BY AUTOMATED COUNT: 12.1 % (ref 10–15)
FASTING STATUS PATIENT QL REPORTED: YES
HCT VFR BLD AUTO: 39.4 % (ref 35–47)
HDLC SERPL-MCNC: 51 MG/DL
HGB BLD-MCNC: 12.9 G/DL (ref 11.7–15.7)
HOLD SPECIMEN: NORMAL
IMM GRANULOCYTES # BLD: 0 10E3/UL
IMM GRANULOCYTES NFR BLD: 0 %
LDLC SERPL CALC-MCNC: 139 MG/DL
LYMPHOCYTES # BLD AUTO: 1.4 10E3/UL (ref 0.8–5.3)
LYMPHOCYTES NFR BLD AUTO: 24 %
MCH RBC QN AUTO: 32.6 PG (ref 26.5–33)
MCHC RBC AUTO-ENTMCNC: 32.7 G/DL (ref 31.5–36.5)
MCV RBC AUTO: 100 FL (ref 78–100)
MONOCYTES # BLD AUTO: 0.3 10E3/UL (ref 0–1.3)
MONOCYTES NFR BLD AUTO: 5 %
NEUTROPHILS # BLD AUTO: 4 10E3/UL (ref 1.6–8.3)
NEUTROPHILS NFR BLD AUTO: 70 %
NONHDLC SERPL-MCNC: 160 MG/DL
NRBC # BLD AUTO: 0 10E3/UL
NRBC BLD AUTO-RTO: 0 /100
PLATELET # BLD AUTO: 239 10E3/UL (ref 150–450)
PROT SERPL-MCNC: 7.7 G/DL (ref 6.4–8.3)
RBC # BLD AUTO: 3.96 10E6/UL (ref 3.8–5.2)
SHBG SERPL-SCNC: 84 NMOL/L (ref 30–135)
TRIGL SERPL-MCNC: 107 MG/DL
TSH SERPL DL<=0.005 MIU/L-ACNC: 1.88 UIU/ML (ref 0.3–4.2)
WBC # BLD AUTO: 5.7 10E3/UL (ref 4–11)

## 2024-09-20 PROCEDURE — 85025 COMPLETE CBC W/AUTO DIFF WBC: CPT | Mod: ORL | Performed by: OBSTETRICS & GYNECOLOGY

## 2024-09-20 PROCEDURE — 80061 LIPID PANEL: CPT | Mod: ORL | Performed by: OBSTETRICS & GYNECOLOGY

## 2024-09-20 PROCEDURE — 84443 ASSAY THYROID STIM HORMONE: CPT | Performed by: OBSTETRICS & GYNECOLOGY

## 2024-09-20 PROCEDURE — 80076 HEPATIC FUNCTION PANEL: CPT | Performed by: OBSTETRICS & GYNECOLOGY

## 2024-09-20 PROCEDURE — 84270 ASSAY OF SEX HORMONE GLOBUL: CPT | Mod: ORL | Performed by: OBSTETRICS & GYNECOLOGY

## 2024-09-20 PROCEDURE — 84403 ASSAY OF TOTAL TESTOSTERONE: CPT | Mod: ORL | Performed by: OBSTETRICS & GYNECOLOGY

## 2024-09-24 LAB
TESTOST FREE SERPL-MCNC: 0.11 NG/DL
TESTOST SERPL-MCNC: 12 NG/DL (ref 8–60)

## 2024-11-25 ENCOUNTER — MYC MEDICAL ADVICE (OUTPATIENT)
Dept: FAMILY MEDICINE | Facility: CLINIC | Age: 49
End: 2024-11-25

## 2025-05-31 ENCOUNTER — HEALTH MAINTENANCE LETTER (OUTPATIENT)
Age: 50
End: 2025-05-31

## 2025-07-23 ENCOUNTER — TELEPHONE (OUTPATIENT)
Dept: FAMILY MEDICINE | Facility: CLINIC | Age: 50
End: 2025-07-23

## 2025-07-23 NOTE — TELEPHONE ENCOUNTER
Non-adherent to depression medication    Action requested: Evaluate drug refill patterns   The Adherence depression program identifies patients whose proportion of days covered (PDC) is less than 80%. This patient's PDC is 34% over the previous 365 days. Common reasons for non-adherence may include: side effects, affordability, or changes in the drug regimen. Please provide a new prescription if the regimen has changed, and/or discuss the importance of adherence and ways to address barriers with your patient.   The most recent depression medication claim for which you have been identified as the prescriber is listed below.   Non-adherent to depression medication  Date Filled Drug Name / Strength Dispensed Quantity Days Supply Pharmacy Name Pharmacy Phone Prescriber of Record Prescriber Phone Number   [phone] VENLAFAXINE CAP 75MG  [id number] [phone] REINALDO AWA [phone]     Message from her insurance. Hasn't been seen in more than  a year, due for apt

## 2025-08-23 ENCOUNTER — HEALTH MAINTENANCE LETTER (OUTPATIENT)
Age: 50
End: 2025-08-23